# Patient Record
Sex: MALE | Race: WHITE | NOT HISPANIC OR LATINO | Employment: OTHER | URBAN - METROPOLITAN AREA
[De-identification: names, ages, dates, MRNs, and addresses within clinical notes are randomized per-mention and may not be internally consistent; named-entity substitution may affect disease eponyms.]

---

## 2021-05-25 ENCOUNTER — RECORDS - HEALTHEAST (OUTPATIENT)
Dept: ADMINISTRATIVE | Facility: CLINIC | Age: 76
End: 2021-05-25

## 2021-05-26 ENCOUNTER — RECORDS - HEALTHEAST (OUTPATIENT)
Dept: ADMINISTRATIVE | Facility: CLINIC | Age: 76
End: 2021-05-26

## 2021-05-27 ENCOUNTER — RECORDS - HEALTHEAST (OUTPATIENT)
Dept: ADMINISTRATIVE | Facility: CLINIC | Age: 76
End: 2021-05-27

## 2021-05-28 ENCOUNTER — RECORDS - HEALTHEAST (OUTPATIENT)
Dept: ADMINISTRATIVE | Facility: CLINIC | Age: 76
End: 2021-05-28

## 2021-05-29 ENCOUNTER — RECORDS - HEALTHEAST (OUTPATIENT)
Dept: ADMINISTRATIVE | Facility: CLINIC | Age: 76
End: 2021-05-29

## 2021-05-30 ENCOUNTER — RECORDS - HEALTHEAST (OUTPATIENT)
Dept: ADMINISTRATIVE | Facility: CLINIC | Age: 76
End: 2021-05-30

## 2021-05-31 ENCOUNTER — RECORDS - HEALTHEAST (OUTPATIENT)
Dept: ADMINISTRATIVE | Facility: CLINIC | Age: 76
End: 2021-05-31

## 2022-01-01 ENCOUNTER — APPOINTMENT (OUTPATIENT)
Dept: RADIOLOGY | Facility: CLINIC | Age: 77
End: 2022-01-01
Attending: PHYSICIAN ASSISTANT
Payer: COMMERCIAL

## 2022-01-01 ENCOUNTER — HOSPITAL ENCOUNTER (EMERGENCY)
Facility: CLINIC | Age: 77
End: 2022-10-14
Attending: EMERGENCY MEDICINE | Admitting: EMERGENCY MEDICINE
Payer: COMMERCIAL

## 2022-01-01 ENCOUNTER — APPOINTMENT (OUTPATIENT)
Dept: OCCUPATIONAL THERAPY | Facility: CLINIC | Age: 77
End: 2022-01-01
Attending: ORTHOPAEDIC SURGERY
Payer: COMMERCIAL

## 2022-01-01 ENCOUNTER — HOSPITAL ENCOUNTER (OUTPATIENT)
Facility: CLINIC | Age: 77
End: 2022-01-01
Attending: ORTHOPAEDIC SURGERY | Admitting: ORTHOPAEDIC SURGERY
Payer: MEDICARE

## 2022-01-01 ENCOUNTER — ANESTHESIA (OUTPATIENT)
Dept: SURGERY | Facility: CLINIC | Age: 77
End: 2022-01-01
Payer: COMMERCIAL

## 2022-01-01 ENCOUNTER — ANESTHESIA EVENT (OUTPATIENT)
Dept: SURGERY | Facility: CLINIC | Age: 77
End: 2022-01-01
Payer: COMMERCIAL

## 2022-01-01 ENCOUNTER — HOSPITAL ENCOUNTER (OUTPATIENT)
Facility: CLINIC | Age: 77
Discharge: HOME | End: 2022-10-14
Attending: ORTHOPAEDIC SURGERY | Admitting: ORTHOPAEDIC SURGERY
Payer: COMMERCIAL

## 2022-01-01 VITALS
DIASTOLIC BLOOD PRESSURE: 65 MMHG | OXYGEN SATURATION: 96 % | RESPIRATION RATE: 20 BRPM | WEIGHT: 305.4 LBS | TEMPERATURE: 98 F | BODY MASS INDEX: 43.72 KG/M2 | HEART RATE: 74 BPM | SYSTOLIC BLOOD PRESSURE: 149 MMHG | HEIGHT: 70 IN

## 2022-01-01 DIAGNOSIS — I46.9 CARDIAC ARREST (H): ICD-10-CM

## 2022-01-01 DIAGNOSIS — Z98.890 STATUS POST ARTHROSCOPY OF SHOULDER: Primary | ICD-10-CM

## 2022-01-01 DIAGNOSIS — Z96.612 S/P SHOULDER REPLACEMENT, LEFT: ICD-10-CM

## 2022-01-01 LAB
ANION GAP SERPL CALCULATED.3IONS-SCNC: 8 MMOL/L (ref 5–18)
BUN SERPL-MCNC: 18 MG/DL (ref 8–28)
CA-I BLD-MCNC: 4.7 MG/DL (ref 4.4–5.2)
CALCIUM SERPL-MCNC: 8.7 MG/DL (ref 8.5–10.5)
CHLORIDE BLD-SCNC: 94 MMOL/L (ref 98–107)
CO2 SERPL-SCNC: 29 MMOL/L (ref 22–31)
CPB POCT: NO
CREAT SERPL-MCNC: 0.97 MG/DL (ref 0.7–1.3)
GFR SERPL CREATININE-BSD FRML MDRD: 80 ML/MIN/1.73M2
GLUCOSE BLD-MCNC: 195 MG/DL (ref 70–125)
GLUCOSE BLD-MCNC: 527 MG/DL (ref 70–99)
GLUCOSE BLDC GLUCOMTR-MCNC: 126 MG/DL (ref 70–99)
GLUCOSE BLDC GLUCOMTR-MCNC: 160 MG/DL (ref 70–99)
GLUCOSE BLDC GLUCOMTR-MCNC: 185 MG/DL (ref 70–99)
GLUCOSE BLDC GLUCOMTR-MCNC: 228 MG/DL (ref 70–99)
GLUCOSE BLDC GLUCOMTR-MCNC: 285 MG/DL (ref 70–99)
GLUCOSE BLDC GLUCOMTR-MCNC: 321 MG/DL (ref 70–99)
HCO3 BLDV-SCNC: 23 MMOL/L (ref 21–28)
HCT VFR BLD CALC: 36 % (ref 40–53)
HGB BLD-MCNC: 10.7 G/DL (ref 13.3–17.7)
HGB BLD-MCNC: 12.2 G/DL (ref 13.3–17.7)
HOLD SPECIMEN: NORMAL
MAGNESIUM SERPL-MCNC: 1.6 MG/DL (ref 1.8–2.6)
PCO2 BLDV: 121 MM HG (ref 40–50)
PH BLDV: 6.88 [PH] (ref 7.32–7.43)
PO2 BLDV: 18 MM HG (ref 25–47)
POTASSIUM BLD-SCNC: 4.3 MMOL/L (ref 3.5–5)
POTASSIUM BLD-SCNC: 4.6 MMOL/L (ref 3.4–5.3)
SAO2 % BLDV: 10 % (ref 94–100)
SODIUM BLD-SCNC: 129 MMOL/L (ref 133–144)
SODIUM SERPL-SCNC: 131 MMOL/L (ref 136–145)
TROPONIN T BLD-MCNC: 0.01 UG/L

## 2022-01-01 PROCEDURE — 92950 HEART/LUNG RESUSCITATION CPR: CPT | Performed by: INTERNAL MEDICINE

## 2022-01-01 PROCEDURE — 99285 EMERGENCY DEPT VISIT HI MDM: CPT | Mod: 25 | Performed by: EMERGENCY MEDICINE

## 2022-01-01 PROCEDURE — 250N000011 HC RX IP 250 OP 636: Performed by: ANESTHESIOLOGY

## 2022-01-01 PROCEDURE — 272N000001 HC OR GENERAL SUPPLY STERILE: Performed by: ORTHOPAEDIC SURGERY

## 2022-01-01 PROCEDURE — 258N000003 HC RX IP 258 OP 636: Performed by: ANESTHESIOLOGY

## 2022-01-01 PROCEDURE — 250N000013 HC RX MED GY IP 250 OP 250 PS 637: Performed by: ANESTHESIOLOGY

## 2022-01-01 PROCEDURE — C1713 ANCHOR/SCREW BN/BN,TIS/BN: HCPCS | Performed by: ORTHOPAEDIC SURGERY

## 2022-01-01 PROCEDURE — 999N000141 HC STATISTIC PRE-PROCEDURE NURSING ASSESSMENT: Performed by: ORTHOPAEDIC SURGERY

## 2022-01-01 PROCEDURE — 80048 BASIC METABOLIC PNL TOTAL CA: CPT | Performed by: HOSPITALIST

## 2022-01-01 PROCEDURE — 82962 GLUCOSE BLOOD TEST: CPT

## 2022-01-01 PROCEDURE — 360N000077 HC SURGERY LEVEL 4, PER MIN: Performed by: ORTHOPAEDIC SURGERY

## 2022-01-01 PROCEDURE — 36415 COLL VENOUS BLD VENIPUNCTURE: CPT | Performed by: PHYSICIAN ASSISTANT

## 2022-01-01 PROCEDURE — 370N000017 HC ANESTHESIA TECHNICAL FEE, PER MIN: Performed by: ORTHOPAEDIC SURGERY

## 2022-01-01 PROCEDURE — 250N000011 HC RX IP 250 OP 636: Performed by: HOSPITALIST

## 2022-01-01 PROCEDURE — 97535 SELF CARE MNGMENT TRAINING: CPT | Mod: GO

## 2022-01-01 PROCEDURE — 97166 OT EVAL MOD COMPLEX 45 MIN: CPT | Mod: GO

## 2022-01-01 PROCEDURE — 258N000001 HC RX 258: Performed by: ORTHOPAEDIC SURGERY

## 2022-01-01 PROCEDURE — 99285 EMERGENCY DEPT VISIT HI MDM: CPT | Performed by: EMERGENCY MEDICINE

## 2022-01-01 PROCEDURE — C9290 INJ, BUPIVACAINE LIPOSOME: HCPCS | Performed by: ANESTHESIOLOGY

## 2022-01-01 PROCEDURE — 83735 ASSAY OF MAGNESIUM: CPT | Performed by: HOSPITALIST

## 2022-01-01 PROCEDURE — C1894 INTRO/SHEATH, NON-LASER: HCPCS | Performed by: INTERNAL MEDICINE

## 2022-01-01 PROCEDURE — 96372 THER/PROPH/DIAG INJ SC/IM: CPT | Performed by: HOSPITALIST

## 2022-01-01 PROCEDURE — 250N000011 HC RX IP 250 OP 636: Performed by: ORTHOPAEDIC SURGERY

## 2022-01-01 PROCEDURE — 84484 ASSAY OF TROPONIN QUANT: CPT

## 2022-01-01 PROCEDURE — 250N000009 HC RX 250: Performed by: ANESTHESIOLOGY

## 2022-01-01 PROCEDURE — 82947 ASSAY GLUCOSE BLOOD QUANT: CPT

## 2022-01-01 PROCEDURE — 99204 OFFICE O/P NEW MOD 45 MIN: CPT | Performed by: HOSPITALIST

## 2022-01-01 PROCEDURE — 250N000011 HC RX IP 250 OP 636: Performed by: PHYSICIAN ASSISTANT

## 2022-01-01 PROCEDURE — 99215 OFFICE O/P EST HI 40 MIN: CPT | Performed by: HOSPITALIST

## 2022-01-01 PROCEDURE — 272N000001 HC OR GENERAL SUPPLY STERILE: Performed by: INTERNAL MEDICINE

## 2022-01-01 PROCEDURE — 97110 THERAPEUTIC EXERCISES: CPT | Mod: GO

## 2022-01-01 PROCEDURE — C1769 GUIDE WIRE: HCPCS | Performed by: INTERNAL MEDICINE

## 2022-01-01 PROCEDURE — 258N000003 HC RX IP 258 OP 636: Performed by: NURSE ANESTHETIST, CERTIFIED REGISTERED

## 2022-01-01 PROCEDURE — 250N000011 HC RX IP 250 OP 636: Performed by: NURSE ANESTHETIST, CERTIFIED REGISTERED

## 2022-01-01 PROCEDURE — 250N000025 HC SEVOFLURANE, PER MIN: Performed by: ORTHOPAEDIC SURGERY

## 2022-01-01 PROCEDURE — 82330 ASSAY OF CALCIUM: CPT

## 2022-01-01 PROCEDURE — 31500 INSERT EMERGENCY AIRWAY: CPT | Performed by: EMERGENCY MEDICINE

## 2022-01-01 PROCEDURE — 92950 HEART/LUNG RESUSCITATION CPR: CPT | Performed by: EMERGENCY MEDICINE

## 2022-01-01 PROCEDURE — 258N000003 HC RX IP 258 OP 636: Performed by: PHYSICIAN ASSISTANT

## 2022-01-01 PROCEDURE — 250N000013 HC RX MED GY IP 250 OP 250 PS 637: Performed by: PHYSICIAN ASSISTANT

## 2022-01-01 PROCEDURE — 250N000013 HC RX MED GY IP 250 OP 250 PS 637: Performed by: HOSPITALIST

## 2022-01-01 PROCEDURE — 99207 CV CARDIAC CATHERIZATION: CPT | Performed by: INTERNAL MEDICINE

## 2022-01-01 PROCEDURE — C1776 JOINT DEVICE (IMPLANTABLE): HCPCS | Performed by: ORTHOPAEDIC SURGERY

## 2022-01-01 PROCEDURE — 250N000009 HC RX 250: Performed by: NURSE ANESTHETIST, CERTIFIED REGISTERED

## 2022-01-01 PROCEDURE — 250N000012 HC RX MED GY IP 250 OP 636 PS 637: Performed by: HOSPITALIST

## 2022-01-01 PROCEDURE — 85018 HEMOGLOBIN: CPT | Performed by: PHYSICIAN ASSISTANT

## 2022-01-01 PROCEDURE — 999N000065 XR SHOULDER LEFT PORT G/E 2 VIEWS: Mod: LT

## 2022-01-01 PROCEDURE — 710N000010 HC RECOVERY PHASE 1, LEVEL 2, PER MIN: Performed by: ORTHOPAEDIC SURGERY

## 2022-01-01 PROCEDURE — 250N000009 HC RX 250: Performed by: ORTHOPAEDIC SURGERY

## 2022-01-01 DEVICE — TRAY REVERSE LOW OFFSET +0 DWF510: Type: IMPLANTABLE DEVICE | Site: SHOULDER | Status: FUNCTIONAL

## 2022-01-01 DEVICE — IMPLANTABLE DEVICE: Type: IMPLANTABLE DEVICE | Site: SHOULDER | Status: FUNCTIONAL

## 2022-01-01 DEVICE — SCREW PERIPHERAL 5.0X30MM DWJ330: Type: IMPLANTABLE DEVICE | Site: SHOULDER | Status: FUNCTIONAL

## 2022-01-01 DEVICE — SCREW PERIPHERAL 5.0X34MM DWJ334: Type: IMPLANTABLE DEVICE | Site: SHOULDER | Status: FUNCTIONAL

## 2022-01-01 DEVICE — SCREW BSPLT 35MM 6.5MM AQLS PRFRM GLND RVRS CNTR NS DWJ135: Type: IMPLANTABLE DEVICE | Site: SHOULDER | Status: FUNCTIONAL

## 2022-01-01 DEVICE — BASEPLATE STANDARD AP REVERSED 29MM: Type: IMPLANTABLE DEVICE | Site: SHOULDER | Status: FUNCTIONAL

## 2022-01-01 DEVICE — SCREW PERIPHERAL 18MM DWJ318: Type: IMPLANTABLE DEVICE | Site: SHOULDER | Status: FUNCTIONAL

## 2022-01-01 DEVICE — IMPLANTABLE DEVICE
Type: IMPLANTABLE DEVICE | Site: SHOULDER | Status: FUNCTIONAL
Brand: TORNIER FLEX SHOULDER SYSTEM

## 2022-01-01 RX ORDER — ASPIRIN 81 MG/1
81 TABLET ORAL 2 TIMES DAILY
Status: DISCONTINUED | OUTPATIENT
Start: 2022-01-01 | End: 2022-01-01 | Stop reason: HOSPADM

## 2022-01-01 RX ORDER — PROCHLORPERAZINE MALEATE 5 MG
5 TABLET ORAL EVERY 6 HOURS PRN
Status: DISCONTINUED | OUTPATIENT
Start: 2022-01-01 | End: 2022-01-01 | Stop reason: HOSPADM

## 2022-01-01 RX ORDER — OXYCODONE HYDROCHLORIDE 5 MG/1
5 TABLET ORAL EVERY 4 HOURS PRN
Status: DISCONTINUED | OUTPATIENT
Start: 2022-01-01 | End: 2022-01-01 | Stop reason: HOSPADM

## 2022-01-01 RX ORDER — VANCOMYCIN HYDROCHLORIDE 1 G/20ML
INJECTION, POWDER, LYOPHILIZED, FOR SOLUTION INTRAVENOUS
Status: DISCONTINUED
Start: 2022-01-01 | End: 2022-01-01 | Stop reason: HOSPADM

## 2022-01-01 RX ORDER — BUPIVACAINE HYDROCHLORIDE 5 MG/ML
INJECTION, SOLUTION EPIDURAL; INTRACAUDAL
Status: DISCONTINUED | OUTPATIENT
Start: 2022-01-01 | End: 2022-01-01

## 2022-01-01 RX ORDER — OXYCODONE HYDROCHLORIDE 5 MG/1
10 TABLET ORAL EVERY 4 HOURS PRN
Status: DISCONTINUED | OUTPATIENT
Start: 2022-01-01 | End: 2022-01-01 | Stop reason: HOSPADM

## 2022-01-01 RX ORDER — POLYETHYLENE GLYCOL 3350 17 G/17G
17 POWDER, FOR SOLUTION ORAL DAILY
Status: DISCONTINUED | OUTPATIENT
Start: 2022-01-01 | End: 2022-01-01 | Stop reason: HOSPADM

## 2022-01-01 RX ORDER — ACETAMINOPHEN 325 MG/1
975 TABLET ORAL EVERY 8 HOURS
Status: DISCONTINUED | OUTPATIENT
Start: 2022-01-01 | End: 2022-01-01 | Stop reason: HOSPADM

## 2022-01-01 RX ORDER — PROPOFOL 10 MG/ML
INJECTION, EMULSION INTRAVENOUS PRN
Status: DISCONTINUED | OUTPATIENT
Start: 2022-01-01 | End: 2022-01-01

## 2022-01-01 RX ORDER — FENTANYL CITRATE 50 UG/ML
50 INJECTION, SOLUTION INTRAMUSCULAR; INTRAVENOUS
Status: DISCONTINUED | OUTPATIENT
Start: 2022-01-01 | End: 2022-01-01 | Stop reason: HOSPADM

## 2022-01-01 RX ORDER — NALOXONE HYDROCHLORIDE 0.4 MG/ML
0.2 INJECTION, SOLUTION INTRAMUSCULAR; INTRAVENOUS; SUBCUTANEOUS
Status: DISCONTINUED | OUTPATIENT
Start: 2022-01-01 | End: 2022-01-01 | Stop reason: HOSPADM

## 2022-01-01 RX ORDER — HYDROMORPHONE HCL IN WATER/PF 6 MG/30 ML
0.4 PATIENT CONTROLLED ANALGESIA SYRINGE INTRAVENOUS
Status: DISCONTINUED | OUTPATIENT
Start: 2022-01-01 | End: 2022-01-01 | Stop reason: HOSPADM

## 2022-01-01 RX ORDER — ACETAMINOPHEN 325 MG/1
650 TABLET ORAL EVERY 4 HOURS PRN
Status: DISCONTINUED | OUTPATIENT
Start: 2022-10-16 | End: 2022-01-01 | Stop reason: HOSPADM

## 2022-01-01 RX ORDER — ESCITALOPRAM OXALATE 20 MG/1
20 TABLET ORAL DAILY
COMMUNITY

## 2022-01-01 RX ORDER — BISACODYL 10 MG
10 SUPPOSITORY, RECTAL RECTAL DAILY PRN
Status: DISCONTINUED | OUTPATIENT
Start: 2022-01-01 | End: 2022-01-01 | Stop reason: HOSPADM

## 2022-01-01 RX ORDER — SIMVASTATIN 20 MG
20 TABLET ORAL AT BEDTIME
Status: DISCONTINUED | OUTPATIENT
Start: 2022-01-01 | End: 2022-01-01 | Stop reason: HOSPADM

## 2022-01-01 RX ORDER — DEXAMETHASONE SODIUM PHOSPHATE 4 MG/ML
INJECTION, SOLUTION INTRA-ARTICULAR; INTRALESIONAL; INTRAMUSCULAR; INTRAVENOUS; SOFT TISSUE PRN
Status: DISCONTINUED | OUTPATIENT
Start: 2022-01-01 | End: 2022-01-01

## 2022-01-01 RX ORDER — FLUTICASONE PROPIONATE 50 MCG
2 SPRAY, SUSPENSION (ML) NASAL DAILY
Status: DISCONTINUED | OUTPATIENT
Start: 2022-01-01 | End: 2022-01-01 | Stop reason: HOSPADM

## 2022-01-01 RX ORDER — ONDANSETRON 4 MG/1
4 TABLET, ORALLY DISINTEGRATING ORAL EVERY 30 MIN PRN
Status: DISCONTINUED | OUTPATIENT
Start: 2022-01-01 | End: 2022-01-01 | Stop reason: HOSPADM

## 2022-01-01 RX ORDER — OXYCODONE HYDROCHLORIDE 5 MG/1
5 TABLET ORAL EVERY 4 HOURS PRN
Qty: 25 TABLET | Refills: 0 | Status: SHIPPED | OUTPATIENT
Start: 2022-01-01

## 2022-01-01 RX ORDER — GABAPENTIN 300 MG/1
600 CAPSULE ORAL 3 TIMES DAILY
COMMUNITY

## 2022-01-01 RX ORDER — DEXTROSE MONOHYDRATE 25 G/50ML
25-50 INJECTION, SOLUTION INTRAVENOUS
Status: DISCONTINUED | OUTPATIENT
Start: 2022-01-01 | End: 2022-01-01 | Stop reason: HOSPADM

## 2022-01-01 RX ORDER — SODIUM CHLORIDE, SODIUM LACTATE, POTASSIUM CHLORIDE, CALCIUM CHLORIDE 600; 310; 30; 20 MG/100ML; MG/100ML; MG/100ML; MG/100ML
INJECTION, SOLUTION INTRAVENOUS CONTINUOUS
Status: DISCONTINUED | OUTPATIENT
Start: 2022-01-01 | End: 2022-01-01 | Stop reason: HOSPADM

## 2022-01-01 RX ORDER — FENTANYL CITRATE 50 UG/ML
25 INJECTION, SOLUTION INTRAMUSCULAR; INTRAVENOUS EVERY 5 MIN PRN
Status: DISCONTINUED | OUTPATIENT
Start: 2022-01-01 | End: 2022-01-01 | Stop reason: HOSPADM

## 2022-01-01 RX ORDER — TRANEXAMIC ACID 650 MG/1
1950 TABLET ORAL ONCE
Status: COMPLETED | OUTPATIENT
Start: 2022-01-01 | End: 2022-01-01

## 2022-01-01 RX ORDER — CEFAZOLIN SODIUM/WATER 2 G/20 ML
2 SYRINGE (ML) INTRAVENOUS SEE ADMIN INSTRUCTIONS
Status: DISCONTINUED | OUTPATIENT
Start: 2022-01-01 | End: 2022-01-01 | Stop reason: HOSPADM

## 2022-01-01 RX ORDER — POLYETHYLENE GLYCOL 3350 17 G/17G
1 POWDER, FOR SOLUTION ORAL DAILY PRN
COMMUNITY

## 2022-01-01 RX ORDER — NICOTINE POLACRILEX 4 MG
15-30 LOZENGE BUCCAL
Status: DISCONTINUED | OUTPATIENT
Start: 2022-01-01 | End: 2022-01-01 | Stop reason: HOSPADM

## 2022-01-01 RX ORDER — ONDANSETRON 2 MG/ML
4 INJECTION INTRAMUSCULAR; INTRAVENOUS EVERY 30 MIN PRN
Status: DISCONTINUED | OUTPATIENT
Start: 2022-01-01 | End: 2022-01-01 | Stop reason: HOSPADM

## 2022-01-01 RX ORDER — HYDROCODONE BITARTRATE AND ACETAMINOPHEN 5; 325 MG/1; MG/1
1 TABLET ORAL EVERY 4 HOURS PRN
Status: ON HOLD | COMMUNITY
End: 2022-01-01

## 2022-01-01 RX ORDER — ACETAMINOPHEN 325 MG/1
975 TABLET ORAL ONCE
Status: COMPLETED | OUTPATIENT
Start: 2022-01-01 | End: 2022-01-01

## 2022-01-01 RX ORDER — GABAPENTIN 300 MG/1
600 CAPSULE ORAL 3 TIMES DAILY
Status: DISCONTINUED | OUTPATIENT
Start: 2022-01-01 | End: 2022-01-01 | Stop reason: HOSPADM

## 2022-01-01 RX ORDER — CEFAZOLIN SODIUM IN 0.9 % NACL 3 G/100 ML
INTRAVENOUS SOLUTION, PIGGYBACK (ML) INTRAVENOUS PRN
Status: DISCONTINUED | OUTPATIENT
Start: 2022-01-01 | End: 2022-01-01

## 2022-01-01 RX ORDER — ONDANSETRON 2 MG/ML
4 INJECTION INTRAMUSCULAR; INTRAVENOUS EVERY 6 HOURS PRN
Status: DISCONTINUED | OUTPATIENT
Start: 2022-01-01 | End: 2022-01-01 | Stop reason: HOSPADM

## 2022-01-01 RX ORDER — GLIPIZIDE 10 MG/1
10 TABLET, FILM COATED, EXTENDED RELEASE ORAL 2 TIMES DAILY
COMMUNITY

## 2022-01-01 RX ORDER — MAGNESIUM HYDROXIDE 1200 MG/15ML
LIQUID ORAL PRN
Status: DISCONTINUED | OUTPATIENT
Start: 2022-01-01 | End: 2022-01-01 | Stop reason: HOSPADM

## 2022-01-01 RX ORDER — POLYETHYLENE GLYCOL 3350 17 G/17G
17 POWDER, FOR SOLUTION ORAL DAILY PRN
Status: DISCONTINUED | OUTPATIENT
Start: 2022-01-01 | End: 2022-01-01 | Stop reason: HOSPADM

## 2022-01-01 RX ORDER — ESCITALOPRAM OXALATE 20 MG/1
20 TABLET ORAL DAILY
Status: DISCONTINUED | OUTPATIENT
Start: 2022-01-01 | End: 2022-01-01 | Stop reason: HOSPADM

## 2022-01-01 RX ORDER — LABETALOL HYDROCHLORIDE 5 MG/ML
10 INJECTION, SOLUTION INTRAVENOUS ONCE
Status: COMPLETED | OUTPATIENT
Start: 2022-01-01 | End: 2022-01-01

## 2022-01-01 RX ORDER — ACETAMINOPHEN 325 MG/1
325 TABLET ORAL EVERY 6 HOURS PRN
Qty: 30 TABLET | Refills: 0 | Status: SHIPPED | OUTPATIENT
Start: 2022-01-01

## 2022-01-01 RX ORDER — LISINOPRIL 20 MG/1
20 TABLET ORAL DAILY
Status: DISCONTINUED | OUTPATIENT
Start: 2022-01-01 | End: 2022-01-01 | Stop reason: HOSPADM

## 2022-01-01 RX ORDER — HYDROMORPHONE HCL IN WATER/PF 6 MG/30 ML
0.2 PATIENT CONTROLLED ANALGESIA SYRINGE INTRAVENOUS
Status: DISCONTINUED | OUTPATIENT
Start: 2022-01-01 | End: 2022-01-01 | Stop reason: HOSPADM

## 2022-01-01 RX ORDER — CEFAZOLIN SODIUM/WATER 3 G/30 ML
SYRINGE (ML) INTRAVENOUS
Status: DISCONTINUED
Start: 2022-01-01 | End: 2022-01-01 | Stop reason: HOSPADM

## 2022-01-01 RX ORDER — LIDOCAINE 40 MG/G
CREAM TOPICAL
Status: DISCONTINUED | OUTPATIENT
Start: 2022-01-01 | End: 2022-01-01 | Stop reason: HOSPADM

## 2022-01-01 RX ORDER — VANCOMYCIN HYDROCHLORIDE 1 G/20ML
INJECTION, POWDER, LYOPHILIZED, FOR SOLUTION INTRAVENOUS PRN
Status: DISCONTINUED | OUTPATIENT
Start: 2022-01-01 | End: 2022-01-01 | Stop reason: HOSPADM

## 2022-01-01 RX ORDER — NALOXONE HYDROCHLORIDE 0.4 MG/ML
0.4 INJECTION, SOLUTION INTRAMUSCULAR; INTRAVENOUS; SUBCUTANEOUS
Status: DISCONTINUED | OUTPATIENT
Start: 2022-01-01 | End: 2022-01-01 | Stop reason: HOSPADM

## 2022-01-01 RX ORDER — VANCOMYCIN HYDROCHLORIDE 1 G/20ML
1 INJECTION, POWDER, LYOPHILIZED, FOR SOLUTION INTRAVENOUS ONCE
Status: DISCONTINUED | OUTPATIENT
Start: 2022-01-01 | End: 2022-01-01 | Stop reason: HOSPADM

## 2022-01-01 RX ORDER — CEFAZOLIN SODIUM 2 G/100ML
2 INJECTION, SOLUTION INTRAVENOUS EVERY 8 HOURS
Status: COMPLETED | OUTPATIENT
Start: 2022-01-01 | End: 2022-01-01

## 2022-01-01 RX ORDER — MAGNESIUM SULFATE HEPTAHYDRATE 40 MG/ML
2 INJECTION, SOLUTION INTRAVENOUS ONCE
Status: COMPLETED | OUTPATIENT
Start: 2022-01-01 | End: 2022-01-01

## 2022-01-01 RX ORDER — AMOXICILLIN 250 MG
1 CAPSULE ORAL DAILY PRN
Qty: 20 TABLET | Refills: 0 | Status: SHIPPED | OUTPATIENT
Start: 2022-01-01

## 2022-01-01 RX ORDER — ONDANSETRON 4 MG/1
4 TABLET, ORALLY DISINTEGRATING ORAL EVERY 6 HOURS PRN
Status: DISCONTINUED | OUTPATIENT
Start: 2022-01-01 | End: 2022-01-01 | Stop reason: HOSPADM

## 2022-01-01 RX ORDER — AMOXICILLIN 250 MG
1 CAPSULE ORAL 2 TIMES DAILY
Status: DISCONTINUED | OUTPATIENT
Start: 2022-01-01 | End: 2022-01-01 | Stop reason: HOSPADM

## 2022-01-01 RX ORDER — ONDANSETRON 2 MG/ML
INJECTION INTRAMUSCULAR; INTRAVENOUS PRN
Status: DISCONTINUED | OUTPATIENT
Start: 2022-01-01 | End: 2022-01-01

## 2022-01-01 RX ORDER — INSULIN LISPRO 100 [IU]/ML
18 INJECTION, SUSPENSION SUBCUTANEOUS
COMMUNITY

## 2022-01-01 RX ORDER — FENTANYL CITRATE 50 UG/ML
100 INJECTION, SOLUTION INTRAMUSCULAR; INTRAVENOUS
Status: DISCONTINUED | OUTPATIENT
Start: 2022-01-01 | End: 2022-01-01 | Stop reason: HOSPADM

## 2022-01-01 RX ORDER — PANTOPRAZOLE SODIUM 20 MG/1
40 TABLET, DELAYED RELEASE ORAL DAILY
Status: DISCONTINUED | OUTPATIENT
Start: 2022-01-01 | End: 2022-01-01 | Stop reason: HOSPADM

## 2022-01-01 RX ORDER — HYDROMORPHONE HCL IN WATER/PF 6 MG/30 ML
0.2 PATIENT CONTROLLED ANALGESIA SYRINGE INTRAVENOUS EVERY 5 MIN PRN
Status: DISCONTINUED | OUTPATIENT
Start: 2022-01-01 | End: 2022-01-01 | Stop reason: HOSPADM

## 2022-01-01 RX ORDER — CEFAZOLIN SODIUM/WATER 2 G/20 ML
2 SYRINGE (ML) INTRAVENOUS
Status: DISCONTINUED | OUTPATIENT
Start: 2022-01-01 | End: 2022-01-01 | Stop reason: HOSPADM

## 2022-01-01 RX ADMIN — DEXAMETHASONE SODIUM PHOSPHATE 4 MG: 4 INJECTION, SOLUTION INTRA-ARTICULAR; INTRALESIONAL; INTRAMUSCULAR; INTRAVENOUS; SOFT TISSUE at 07:50

## 2022-01-01 RX ADMIN — ONDANSETRON 4 MG: 2 INJECTION INTRAMUSCULAR; INTRAVENOUS at 07:50

## 2022-01-01 RX ADMIN — POLYETHYLENE GLYCOL 3350 17 G: 17 POWDER, FOR SOLUTION ORAL at 08:43

## 2022-01-01 RX ADMIN — INSULIN ASPART 14 UNITS: 100 INJECTION, SUSPENSION SUBCUTANEOUS at 08:42

## 2022-01-01 RX ADMIN — MIDAZOLAM HYDROCHLORIDE 1 MG: 1 INJECTION, SOLUTION INTRAMUSCULAR; INTRAVENOUS at 07:02

## 2022-01-01 RX ADMIN — LISINOPRIL 20 MG: 20 TABLET ORAL at 16:03

## 2022-01-01 RX ADMIN — PHENYLEPHRINE HYDROCHLORIDE 0.3 MCG/KG/MIN: 10 INJECTION INTRAVENOUS at 07:58

## 2022-01-01 RX ADMIN — SODIUM CHLORIDE, POTASSIUM CHLORIDE, SODIUM LACTATE AND CALCIUM CHLORIDE: 600; 310; 30; 20 INJECTION, SOLUTION INTRAVENOUS at 06:40

## 2022-01-01 RX ADMIN — GABAPENTIN 600 MG: 300 CAPSULE ORAL at 20:44

## 2022-01-01 RX ADMIN — SODIUM CHLORIDE, POTASSIUM CHLORIDE, SODIUM LACTATE AND CALCIUM CHLORIDE: 600; 310; 30; 20 INJECTION, SOLUTION INTRAVENOUS at 16:03

## 2022-01-01 RX ADMIN — GABAPENTIN 600 MG: 300 CAPSULE ORAL at 08:34

## 2022-01-01 RX ADMIN — PHENYLEPHRINE HYDROCHLORIDE 100 MCG: 10 INJECTION INTRAVENOUS at 07:58

## 2022-01-01 RX ADMIN — PANTOPRAZOLE SODIUM 40 MG: 20 TABLET, DELAYED RELEASE ORAL at 08:34

## 2022-01-01 RX ADMIN — ROCURONIUM BROMIDE 50 MG: 10 INJECTION, SOLUTION INTRAVENOUS at 07:41

## 2022-01-01 RX ADMIN — ACETAMINOPHEN 975 MG: 325 TABLET, FILM COATED ORAL at 06:22

## 2022-01-01 RX ADMIN — CEFAZOLIN SODIUM 2 G: 2 INJECTION, SOLUTION INTRAVENOUS at 00:39

## 2022-01-01 RX ADMIN — FLUTICASONE PROPIONATE 2 SPRAY: 50 SPRAY, METERED NASAL at 08:35

## 2022-01-01 RX ADMIN — INSULIN ASPART 2 UNITS: 100 INJECTION, SOLUTION INTRAVENOUS; SUBCUTANEOUS at 08:35

## 2022-01-01 RX ADMIN — SODIUM CHLORIDE, POTASSIUM CHLORIDE, SODIUM LACTATE AND CALCIUM CHLORIDE: 600; 310; 30; 20 INJECTION, SOLUTION INTRAVENOUS at 08:58

## 2022-01-01 RX ADMIN — ASPIRIN 81 MG: 81 TABLET, COATED ORAL at 08:34

## 2022-01-01 RX ADMIN — ACETAMINOPHEN 975 MG: 325 TABLET, FILM COATED ORAL at 21:25

## 2022-01-01 RX ADMIN — INSULIN ASPART 14 UNITS: 100 INJECTION, SUSPENSION SUBCUTANEOUS at 16:49

## 2022-01-01 RX ADMIN — INSULIN ASPART 3 UNITS: 100 INJECTION, SOLUTION INTRAVENOUS; SUBCUTANEOUS at 16:50

## 2022-01-01 RX ADMIN — SIMVASTATIN 20 MG: 20 TABLET, FILM COATED ORAL at 20:44

## 2022-01-01 RX ADMIN — SENNOSIDES AND DOCUSATE SODIUM 1 TABLET: 50; 8.6 TABLET ORAL at 20:44

## 2022-01-01 RX ADMIN — MAGNESIUM SULFATE HEPTAHYDRATE 2 G: 40 INJECTION, SOLUTION INTRAVENOUS at 09:33

## 2022-01-01 RX ADMIN — ACETAMINOPHEN 975 MG: 325 TABLET, FILM COATED ORAL at 13:34

## 2022-01-01 RX ADMIN — OXYCODONE HYDROCHLORIDE 5 MG: 5 TABLET ORAL at 09:53

## 2022-01-01 RX ADMIN — LABETALOL HYDROCHLORIDE 10 MG: 5 INJECTION, SOLUTION INTRAVENOUS at 10:34

## 2022-01-01 RX ADMIN — SUGAMMADEX 200 MG: 100 INJECTION, SOLUTION INTRAVENOUS at 09:19

## 2022-01-01 RX ADMIN — OXYCODONE HYDROCHLORIDE 5 MG: 5 TABLET ORAL at 18:56

## 2022-01-01 RX ADMIN — OXYCODONE HYDROCHLORIDE 10 MG: 5 TABLET ORAL at 00:47

## 2022-01-01 RX ADMIN — SENNOSIDES AND DOCUSATE SODIUM 1 TABLET: 50; 8.6 TABLET ORAL at 08:34

## 2022-01-01 RX ADMIN — Medication 3 G: at 07:33

## 2022-01-01 RX ADMIN — TRANEXAMIC ACID 1950 MG: 650 TABLET ORAL at 06:09

## 2022-01-01 RX ADMIN — ASPIRIN 81 MG: 81 TABLET, COATED ORAL at 20:44

## 2022-01-01 RX ADMIN — ESCITALOPRAM OXALATE 20 MG: 20 TABLET ORAL at 16:03

## 2022-01-01 RX ADMIN — ACETAMINOPHEN 975 MG: 325 TABLET, FILM COATED ORAL at 06:08

## 2022-01-01 RX ADMIN — OXYCODONE HYDROCHLORIDE 5 MG: 5 TABLET ORAL at 19:40

## 2022-01-01 RX ADMIN — BUPIVACAINE 10 ML: 13.3 INJECTION, SUSPENSION, LIPOSOMAL INFILTRATION at 07:05

## 2022-01-01 RX ADMIN — ESCITALOPRAM OXALATE 20 MG: 20 TABLET ORAL at 08:34

## 2022-01-01 RX ADMIN — GABAPENTIN 600 MG: 300 CAPSULE ORAL at 16:03

## 2022-01-01 RX ADMIN — BUPIVACAINE HYDROCHLORIDE 3 ML: 5 INJECTION, SOLUTION EPIDURAL; INTRACAUDAL at 07:07

## 2022-01-01 RX ADMIN — BUPIVACAINE HYDROCHLORIDE 12 ML: 5 INJECTION, SOLUTION EPIDURAL; INTRACAUDAL; PERINEURAL at 07:05

## 2022-01-01 RX ADMIN — CEFAZOLIN SODIUM 2 G: 2 INJECTION, SOLUTION INTRAVENOUS at 16:46

## 2022-01-01 RX ADMIN — OXYCODONE HYDROCHLORIDE 5 MG: 5 TABLET ORAL at 12:34

## 2022-01-01 RX ADMIN — PROPOFOL 150 MG: 10 INJECTION, EMULSION INTRAVENOUS at 07:41

## 2022-01-01 ASSESSMENT — ACTIVITIES OF DAILY LIVING (ADL)
ADLS_ACUITY_SCORE: 38
ADLS_ACUITY_SCORE: 35
ADLS_ACUITY_SCORE: 36
ADLS_ACUITY_SCORE: 35
ADLS_ACUITY_SCORE: 36
ADLS_ACUITY_SCORE: 35
ADLS_ACUITY_SCORE: 36
ADLS_ACUITY_SCORE: 36
ADLS_ACUITY_SCORE: 38
ADLS_ACUITY_SCORE: 38
ADLS_ACUITY_SCORE: 33
ADLS_ACUITY_SCORE: 35
ADLS_ACUITY_SCORE: 35
ADLS_ACUITY_SCORE: 38
ADLS_ACUITY_SCORE: 36
ADLS_ACUITY_SCORE: 38

## 2022-01-01 ASSESSMENT — COLUMBIA-SUICIDE SEVERITY RATING SCALE - C-SSRS
5. HAVE YOU STARTED TO WORK OUT OR WORKED OUT THE DETAILS OF HOW TO KILL YOURSELF? DO YOU INTEND TO CARRY OUT THIS PLAN?: NO
2. HAVE YOU ACTUALLY HAD ANY THOUGHTS OF KILLING YOURSELF IN THE PAST MONTH?: NO
1. IN THE PAST MONTH, HAVE YOU WISHED YOU WERE DEAD OR WISHED YOU COULD GO TO SLEEP AND NOT WAKE UP?: NO
4. HAVE YOU HAD THESE THOUGHTS AND HAD SOME INTENTION OF ACTING ON THEM?: NO
3. HAVE YOU BEEN THINKING ABOUT HOW YOU MIGHT KILL YOURSELF?: NO
6. HAVE YOU EVER DONE ANYTHING, STARTED TO DO ANYTHING, OR PREPARED TO DO ANYTHING TO END YOUR LIFE?: NO

## 2022-10-13 PROBLEM — F33.40 RECURRENT MAJOR DEPRESSIVE DISORDER, IN REMISSION (H): Status: ACTIVE | Noted: 2022-01-01

## 2022-10-13 PROBLEM — E11.9 TYPE 2 DIABETES MELLITUS WITHOUT COMPLICATION, WITHOUT LONG-TERM CURRENT USE OF INSULIN (H): Status: ACTIVE | Noted: 2022-01-01

## 2022-10-13 PROBLEM — I10 PRIMARY HYPERTENSION: Status: ACTIVE | Noted: 2022-01-01

## 2022-10-13 PROBLEM — E78.2 MIXED HYPERLIPIDEMIA: Status: ACTIVE | Noted: 2022-01-01

## 2022-10-13 PROBLEM — Z98.890 STATUS POST ARTHROSCOPY OF SHOULDER: Status: ACTIVE | Noted: 2022-01-01

## 2022-10-13 NOTE — PROGRESS NOTES
Care Management Follow Up    Length of Stay (days): 0    Expected Discharge Date: 10/14/2022     Concerns to be Addressed:     Patient plan of care discussed at interdisciplinary rounds: Yes    Anticipated Discharge Disposition: Home     Anticipated Discharge Services:  Home with family    Education Provided on the Discharge Plan:  AVS per bedside RN.    Patient/Family in Agreement with the Plan: yes      Additional Information:  Chart reviewed. CM met with patient. Patient states he lives with his wife. Denied needs from CM. States family will provide transportation home at discharge. CM will follow for possible discharge needs.       Jo Goncalves RN

## 2022-10-13 NOTE — ANESTHESIA PROCEDURE NOTES
Airway       Patient location during procedure: OR       Procedure Start/Stop Times: 10/13/2022 7:45 AM  Staff -        CRNA: Mikey Hill APRN CRNA       Performed By: CRNA  Consent for Airway        Urgency: elective  Indications and Patient Condition       Indications for airway management: benjamín-procedural         Mask difficulty assessment: 2 - vent by mask + OA or adjuvant +/- NMBA    Final Airway Details       Final airway type: endotracheal airway       Successful airway: ETT - single  Endotracheal Airway Details        ETT size (mm): 8.0       Cuffed: yes       Successful intubation technique: video laryngoscopy (Elective Glide)       VL Blade Size: Glidescope 4       Grade View of Cords: 1       Adjucts: stylet       Position: Right       Measured from: lips       Secured at (cm): 24    Post intubation assessment        Placement verified by: capnometry, equal breath sounds and chest rise        Number of attempts at approach: 1       Ease of procedure: easy       Dentition: Intact and Unchanged    Medication(s) Administered   Medication Administration Time: 10/13/2022 7:45 AM

## 2022-10-13 NOTE — ANESTHESIA PREPROCEDURE EVALUATION
Anesthesia Pre-Procedure Evaluation    Patient: González Sesay   MRN: [id number] : 1945        Procedure : Procedure(s):  REVERSE TOTAL SHOULDER ARTHROPLASTY          Past Medical History:   Diagnosis Date     Basal cell cancer     ear     Bleeding ulcer      Bone lesion      Carotid stenosis     right, mod stenosis     Depression      Diabetes mellitus (H)      Gastro-oesophageal reflux disease      Hypertension      Multiple sclerosis (H)     in remission     Obese       Past Surgical History:   Procedure Laterality Date     ARTHROSCOPY KNEE RT/LT      left     BIOPSY BONE LOWER EXTREMITY  2012    Procedure: BIOPSY BONE LOWER EXTREMITY;  Biopsy Curettage and Bone Grafting of Lesion Right Femoral Neck, Internal Fixation With Cannulated Screw;  Surgeon: Mat Cruz MD;  Location: [facility]     CAROTID ENDARTERECTOMY      right     CARPAL TUNNEL RELEASE RT/LT      both wrists     MIDDLE EAR SURGERY       OPEN REDUCTION INTERNAL FIXATION FEMUR PROXIMAL  2012    Procedure: OPEN REDUCTION INTERNAL FIXATION FEMUR PROXIMAL;;  Surgeon: Mat Cruz MD;  Location: [facility] OR     ROTATOR CUFF REPAIR RT/LT      twice right, once on left     THYROID SURGERY      for goiter      Allergies   Allergen Reactions     Hibiclens Rash      Social History     Tobacco Use     Smoking status: Former     Smokeless tobacco: Never   Substance Use Topics     Alcohol use: Not Currently     Comment: wine socially      Wt Readings from Last 1 Encounters:   10/13/22 138.5 kg (305 lb 6.4 oz)        Anesthesia Evaluation   Pt has had prior anesthetic.     No history of anesthetic complications       ROS/MED HX  ENT/Pulmonary: Comment: Mild hypoventilation apparent after taking vicodin this morning      Neurologic: Comment: Hx of carotid stenosis, s/p CEA    (+) Multiple Sclerosis (in remission),     Cardiovascular:     (+) hypertension-----    METS/Exercise Tolerance:     Hematologic:       Musculoskeletal:       GI/Hepatic:      (+) GERD,     Renal/Genitourinary:       Endo:     (+) type II DM, Obesity,     Psychiatric/Substance Use:       Infectious Disease:       Malignancy:       Other:            Physical Exam    Airway        Mallampati: II   TM distance: > 3 FB   Neck ROM: full   Mouth opening: > 3 cm    Respiratory Devices and Support         Dental     Comment: Fair dentition        Cardiovascular          Rhythm and rate: regular and normal     Pulmonary           breath sounds clear to auscultation           OUTSIDE LABS:  CBC:   Lab Results   Component Value Date    HGB 10.5 (L) 08/24/2012    HGB 10.4 (L) 08/23/2012     BMP:   Lab Results   Component Value Date     08/24/2012     (L) 08/23/2012    POTASSIUM 4.7 08/24/2012    POTASSIUM 4.1 08/23/2012    CHLORIDE 99 08/24/2012    CHLORIDE 100 08/23/2012    CO2 29 08/24/2012    CO2 24 08/23/2012    BUN 18 08/24/2012    BUN 18 08/23/2012    CR 0.90 08/24/2012    CR 1.00 08/23/2012     (H) 10/13/2022     (H) 08/24/2012     COAGS:   Lab Results   Component Value Date    INR 1.6 09/10/2012     POC:   Lab Results   Component Value Date     (H) 08/24/2012     HEPATIC: No results found for: ALBUMIN, PROTTOTAL, ALT, AST, GGT, ALKPHOS, BILITOTAL, BILIDIRECT, CLIFFORD  OTHER:   Lab Results   Component Value Date    MISSY 8.6 08/24/2012       Anesthesia Plan    ASA Status:  3   NPO Status:  NPO Appropriate    Anesthesia Type: General.     - Airway: ETT              Consents    Anesthesia Plan(s) and associated risks, benefits, and realistic alternatives discussed. Questions answered and patient/representative(s) expressed understanding.     - Discussed: Risks, Benefits and Alternatives for the PROCEDURE were discussed     - Discussed with:  Patient, Spouse         Postoperative Care    Pain management: Peripheral nerve block (Single Shot), IV analgesics, Oral pain medications.   PONV prophylaxis: Ondansetron (or other 5HT-3), Dexamethasone or Solumedrol      Comments:                Drew Velasco MD

## 2022-10-13 NOTE — CONSULTS
[facility]  Consult Note - Hospitalist Service  Date of Admission:  10/13/2022  Consult Requested by: Orthopedics  Reason for Consult: Post-op Medical Care, DM2, HTN    Assessment & Plan   González Sesay is a 76 year old old male with a history of DM2, HTN, HLD, MDD underwent left shoulder arthroplasty with general anesthesia. [facility] service was asked to evaluate patient for postoperative medical management as follows below. Please resume the home medications as reconciled and further noted below with ordered hold parameters.  Vital signs have been stable post-operatively including hemodynamically stable blood pressure and heart rate. Thank you for this consult; we will continue to follow this patient until discharge.    HTN  -Order home medications with the written tiered hold parameters given risk for post-operative hypotension. Given ACEi/ARB/diuretic medication, ordered creatine, potassium, and magnesium to evaluate renal function and electrolytes, respectively.      DM2  -Hold home oral anti-glycemics post-op given risk for acute hypoglycemia with sulfonylureas and nephrotoxicity if contrast is utilized in any emergent imaging with biguanides; utilize insulin corrections three times a day and at bedtime. Restart home oral anti-glycemics step-wise as tolerated once patient is eating as per their normal, usual diet.  -Carb-controlled/diabetic diet.      HLD  -Order home statin.    MDD/Anxiety  -Order home medications.     S/p Left Shoulder Arthroplasty  Shoulder OA  Acute Post-Op Pain  -Agree with current pain control regimen; consider acute pain team consultation if increasingly difficult to control or proves refractory.   -PT evaluation.   -OT evaluation.  -IS frequently, initially every hour while awake as tolerated. Directly encouraged and discussed.      Post-Op DVT Prophylaxis  -As per primary surgery team.      Procedure(s):  LEFT REVERSE TOTAL SHOULDER  "ARTHROPLASTY  Post-operative Day: Day of Surgery  Code status:Full Code     Type of Anesthesia   General    Estimated Blood Loss:  50 mL    Hospital Problem List   No problem-specific Assessment & Plan notes found for this encounter.    Active Problems:    * No active hospital problems. *      -Reviewed the patient's preoperative H and P and updated missing elements.  -Home medication reconciliation has been reviewed.  Medications have been ordered as noted from the home list and changes are documented above        The patient's care was discussed with the Patient.    Cristobal Nuñez MD  [facility]  Securely message with the Vocera Web Console (learn more here)  Text page via [employer]       Hospitalist Service    Clinically Significant Risk Factors Present on Admission                 # Hypertension: home medication list includes antihypertensive(s)    # Severe Obesity: Estimated body mass index is 43.82 kg/m  as calculated from the following:    Height as of this encounter: 1.778 m (5' 10\").    Weight as of this encounter: 138.5 kg (305 lb 6.4 oz).        ______________________________________________________________________    Chief Complaint   Post-op Cares, HTN, DM2    History is obtained from the patient    History of Present Illness   González Sesay is a 76 year old old male with a history of DM2, HTN, HLD, MDD underwent left shoulder arthroplasty with general anesthesia.     He has been in his usual state of health prior to presenting for this elective surgery.  He denies any associated symptoms prior to coming in today, and also denies any recent travel domestically or internationally, and also denies any recent sick contacts around him including any family or friends who have been sick.  When asked, he denies any fevers, rigors, chest pain or shortness of breath, nausea or vomiting or abdominal pain, changes in bowel movements/pattern, urinary symptoms, focal " weakness, or any other new and associated symptoms at this time.  He has been compliant with his medications.  14 point review of systems performed with the patient without any other pertinent positives at this time.    Presently, he states his pain is currently well-tolerated.  He denies any new complaints or symptoms at this time.    His social history, family history, and past medical history were directly reviewed with him and corroborated to be accurate as documented below. All questions he had at this time were answered to his verbalized and stated satisfaction and understanding.     Review of Systems   The 10 point Review of Systems is negative other than noted in the HPI or here.     Past Medical History    I have reviewed this patient's medical history and updated it with pertinent information if needed.   Past Medical History:   Diagnosis Date     Basal cell cancer     ear     Bleeding ulcer      Bone lesion      Carotid stenosis     right, mod stenosis     Depression      Diabetes mellitus (H)      Gastro-oesophageal reflux disease      Hypertension      Multiple sclerosis (H)     in remission     Obese        Past Surgical History   I have reviewed this patient's surgical history and updated it with pertinent information if needed.  Past Surgical History:   Procedure Laterality Date     ARTHROSCOPY KNEE RT/LT      left     BIOPSY BONE LOWER EXTREMITY  8/21/2012    Procedure: BIOPSY BONE LOWER EXTREMITY;  Biopsy Curettage and Bone Grafting of Lesion Right Femoral Neck, Internal Fixation With Cannulated Screw;  Surgeon: Mat Cruz MD;  [facility]     CAROTID ENDARTERECTOMY      right     CARPAL TUNNEL RELEASE RT/LT      both wrists     MIDDLE EAR SURGERY       OPEN REDUCTION INTERNAL FIXATION FEMUR PROXIMAL  8/21/2012    Procedure: OPEN REDUCTION INTERNAL FIXATION FEMUR PROXIMAL;;  Surgeon: Mat Cruz MD;  [facility]     ROTATOR CUFF REPAIR RT/LT      twice right, once on left      THYROID SURGERY      for goiter       Social History   I have reviewed this patient's social history and updated it with pertinent information if needed.  Social History     Tobacco Use     Smoking status: Former     Smokeless tobacco: Never   Vaping Use     Vaping Use: Never used   Substance Use Topics     Alcohol use: Not Currently     Comment: wine socially     Drug use: No       Family History   No genetic or inherited conditions endorsed or reported; no family hx of adverse reaction to anesthesia.     Medications   I have reviewed this patient's current medications  Medications Prior to Admission   Medication Sig Dispense Refill Last Dose     aspirin 81 MG tablet Take 81 mg by mouth daily   10/12/2022     dulaglutide (TRULICITY) 1.5 MG/0.5ML pen Inject 1.5 mg Subcutaneous every 7 days Leo   10/9/2022     escitalopram (LEXAPRO) 20 MG tablet Take 20 mg by mouth daily   10/12/2022     FLUTICASONE PROPIONATE NA Spray 2 sprays in nostril daily   10/12/2022     gabapentin (NEURONTIN) 300 MG capsule Take 600 mg by mouth 3 times daily Take 2 pills   10/13/2022 at 0430     glipiZIDE (GLUCOTROL XL) 10 MG 24 hr tablet Take 10 mg by mouth 2 times daily   10/12/2022 at am     HYDROcodone-acetaminophen (NORCO) 5-325 MG tablet Take 1 tablet by mouth every 4 hours as needed for severe pain   10/13/2022 at 0430     insulin lispro protamine-insulin lispro (HUMALOG MIX 75/25 KWIKPEN) (75-25) 100 UNIT/ML pen Inject 18 Units Subcutaneous 2 times daily (before meals)   10/12/2022 at (9 units pm b/4 surgery)     lisinopril (ZESTRIL) 20 MG tablet Take 20 mg by mouth daily   10/11/2022     metFORMIN (GLUCOPHAGE-XR) 500 MG 24 hr tablet Take 2,000 mg by mouth daily (with dinner).   10/11/2022     Multiple Vitamins-Minerals (CENTRUM SILVER PO) Take 1 tablet by mouth daily.   10/12/2022     pantoprazole (PROTONIX) 40 MG enteric coated tablet Take 40 mg by mouth daily   10/13/2022     polyethylene glycol (MIRALAX) 17 g packet Take 17 g  "by mouth daily as needed for constipation   More than a month     simvastatin (ZOCOR) 20 MG tablet Take 20 mg by mouth At Bedtime   10/11/2022     glucose blood VI test strips (ACCU-CHEK ACTIVE STRIPS) strip by In Vitro route daily.          Allergies   Allergies   Allergen Reactions     Hibiclens Rash       Physical Exam   Vital Signs: Temp: 97.9  F (36.6  C) Temp src: Oral BP: (!) 141/81 Pulse: 81   Resp: (!) 81 SpO2: 99 % O2 Device: Nasal cannula Oxygen Delivery: 1 LPM  Weight: 305 lbs 6.4 oz    GENERAL:  Alert, appears comfortable, in no acute distress, appears stated age   HEAD:  Normocephalic, without obvious abnormality, atraumatic   EYES:  PERRL, conjunctiva/corneas clear, no scleral icterus, EOM's intact   NOSE: Nares normal, septum midline, mucosa normal, no drainage   THROAT: Lips, mucosa, and tongue normal; teeth and gums normal, mouth moist   NECK: Supple, symmetrical, trachea midline   BACK:   Symmetric, no curvature, ROM normal   LUNGS:   Clear to auscultation bilaterally, no rales, rhonchi, or wheezing, symmetric chest rise on inhalation, respirations unlabored   CHEST WALL:  No tenderness or deformity   HEART:  Regular rate and rhythm, S1 and S2 normal, no murmur, rub, or gallop    ABDOMEN:   Soft, non-tender, bowel sounds active all four quadrants, no masses, no organomegaly, no rebound or guarding   EXTREMITIES: Extremities normal, atraumatic, no cyanosis or edema    SKIN: Dry to touch, no exanthems in the visualized areas   NEURO: Alert, oriented x 4, moves all four extremities freely/spontaneously with left shoulder immobilized by cast/sling   PSYCH: Cooperative, behavior is appropriate        Data   I personally reviewed the EKG tracing showing NSR.  Results for orders placed or performed during the hospital encounter of 10/13/22 (from the past 24 hour(s))   POC US Guidance Needle Placement    Narrative    Ultrasound was performed as guidance to an anesthesia procedure.  Click   \"PACS images\" " hyperlink below to view any stored images.  For specific   procedure details, view procedure note authored by anesthesia.   Glucose by meter   Result Value Ref Range    GLUCOSE BY METER POCT 185 (H) 70 - 99 mg/dL   Glucose by meter   Result Value Ref Range    GLUCOSE BY METER POCT 160 (H) 70 - 99 mg/dL   XR Shoulder Left Port G/E 2 Views    Narrative    EXAM: XR SHOULDER LEFT PORT G/E 2 VIEWS  LOCATION: [facility]  DATE/TIME: 10/13/2022 10:17 AM    INDICATION: Left shoulder postoperative follow-up.  COMPARISON: None.      Impression    IMPRESSION:  1.  Left reverse total shoulder arthroplasty. The components are well seated without evidence of complication. No fracture or joint malalignment.  2.  Mild acromioclavicular arthrosis.     Most Recent 3 CBC's:No lab results found.  Most Recent 3 BMP's:Recent Labs   Lab Test 10/13/22  0952 10/13/22  0638   * 185*

## 2022-10-13 NOTE — ANESTHESIA PROCEDURE NOTES
Cervical Plexus (superficial) Procedure Note    Pre-Procedure   Staff -        Anesthesiologist:  Drew Velasco MD       Performed By: anesthesiologist       Location: pre-op       Procedure Start/Stop Times: 10/13/2022 7:05 AM and 10/13/2022 7:07 AM       Pre-Anesthestic Checklist: patient identified, IV checked, site marked, risks and benefits discussed, informed consent, monitors and equipment checked, pre-op evaluation, at physician/surgeon's request and post-op pain management  Timeout:       Correct Patient: Yes        Correct Procedure: Yes        Correct Site: Yes        Correct Position: Yes        Correct Laterality: Yes        Site Marked: Yes  Procedure Documentation  Procedure: Cervical Plexus (superficial)       Laterality: left       Patient Position: supine       Patient Prep/Sterile Barriers: sterile gloves, mask       Skin prep: Chloraprep       Needle Type: short bevel       Needle Gauge: 22.        Needle Length (Inches): 2        Ultrasound guided       1. Ultrasound was used to identify targeted nerve, plexus, vascular marker, or fascial plane and place a needle adjacent to it in real-time.       2. Ultrasound was used to visualize the spread of anesthetic in close proximity to the above referenced structure.       3. A permanent image is entered into the patient's record.       4. The visualized anatomic structures appeared normal.       5. There were no apparent abnormal pathologic findings.    Assessment/Narrative         The placement was negative for: blood aspirated, painful injection and site bleeding       Paresthesias: No.       Bolus given via needle. no blood aspirated via catheter.        Secured via.        Insertion/Infusion Method: Single Shot       Complications: none    Medication(s) Administered   Bupivacaine 0.5% PF (Infiltration) - Infiltration   3 mL - 10/13/2022 7:07:00 AM  Medication Administration Time: 10/13/2022 7:05 AM      FOR [facility] ONLY:    "Pain Team Contact information: please page the Pain Team Via [facility]. Search \"Pain\". During daytime hours, please page the attending first. At night please page the resident first.    "

## 2022-10-13 NOTE — INTERVAL H&P NOTE
"I have reviewed the surgical (or preoperative) H&P that is linked to this encounter, and examined the patient. There are no significant changes    Clinical Conditions Present on Arrival:  Clinically Significant Risk Factors Present on Admission                   # Severe Obesity: Estimated body mass index is 43.82 kg/m  as calculated from the following:    Height as of this encounter: 1.778 m (5' 10\").    Weight as of this encounter: 138.5 kg (305 lb 6.4 oz).       "

## 2022-10-13 NOTE — ANESTHESIA POSTPROCEDURE EVALUATION
Patient: González Sesay    Procedure: Procedure(s):  LEFT REVERSE TOTAL SHOULDER ARTHROPLASTY       Anesthesia Type:  General    Note:     Postop Pain Control: Uneventful            Sign Out: Well controlled pain   PONV: No   Neuro/Psych: Uneventful            Sign Out: Acceptable/Baseline neuro status   Airway/Respiratory: Uneventful            Sign Out: Acceptable/Baseline resp. status   CV/Hemodynamics: Uneventful            Sign Out: Acceptable CV status; No obvious hypovolemia; No obvious fluid overload   Other NRE: NONE   DID A NON-ROUTINE EVENT OCCUR? No           Last vitals:  Vitals Value Taken Time   /67 10/13/22 1040   Temp 36.1  C (96.9  F) 10/13/22 1020   Pulse 72 10/13/22 1125   Resp 31 10/13/22 1043   SpO2 95 % 10/13/22 1125   Vitals shown include unvalidated device data.    Electronically Signed By: Drew Velasco MD  October 13, 2022  11:26 AM

## 2022-10-13 NOTE — PHARMACY-ADMISSION MEDICATION HISTORY
Pharmacist completed medication history with the patient while in the [facility].  Prior to admission (PTA) med list completed and updated in the electronic medical record (EMR).  Pharmacy Note - Admission Medication History  Pertinent Provider Information: none   ______________________________________________________________________  Prior To Admission (PTA) med list completed and updated in EMR.     Medications Prior to Admission   Medication Sig Dispense Refill Last Dose     aspirin 81 MG tablet Take 81 mg by mouth daily   10/12/2022     dulaglutide (TRULICITY) 1.5 MG/0.5ML pen Inject 1.5 mg Subcutaneous every 7 days Leo   10/9/2022     escitalopram (LEXAPRO) 20 MG tablet Take 20 mg by mouth daily   10/12/2022     FLUTICASONE PROPIONATE NA Spray 2 sprays in nostril daily   10/12/2022     gabapentin (NEURONTIN) 300 MG capsule Take 600 mg by mouth 3 times daily Take 2 pills   10/13/2022 at 0430     glipiZIDE (GLUCOTROL XL) 10 MG 24 hr tablet Take 10 mg by mouth 2 times daily   10/12/2022 at am     HYDROcodone-acetaminophen (NORCO) 5-325 MG tablet Take 1 tablet by mouth every 4 hours as needed for severe pain   10/13/2022 at 0430     insulin lispro protamine-insulin lispro (HUMALOG MIX 75/25 KWIKPEN) (75-25) 100 UNIT/ML pen Inject 18 Units Subcutaneous 2 times daily (before meals)   10/12/2022 at (9 units pm b/4 surgery)     lisinopril (ZESTRIL) 20 MG tablet Take 20 mg by mouth daily   10/11/2022     metFORMIN (GLUCOPHAGE-XR) 500 MG 24 hr tablet Take 2,000 mg by mouth daily (with dinner).   10/11/2022     Multiple Vitamins-Minerals (CENTRUM SILVER PO) Take 1 tablet by mouth daily.   10/12/2022     pantoprazole (PROTONIX) 40 MG enteric coated tablet Take 40 mg by mouth daily   10/13/2022     polyethylene glycol (MIRALAX) 17 g packet Take 17 g by mouth daily as needed for constipation   More than a month     simvastatin (ZOCOR) 20 MG tablet Take 20 mg by mouth At Bedtime   10/11/2022     glucose blood VI test strips  (ACCU-CHEK ACTIVE STRIPS) strip by In Vitro route daily.            Information source(s): Patient and CareEverywhere/SureScript[employer]  Patient was asked about OTC/herbal products specifically.  PTA med list reflects this.  Based on the pharmacist s assessment, the PTA med list information appears reliable  Allergies were reviewed, assessed, and updated with the patient.    Medications available for use during hospital stay: NONE  Thank you for the opportunity to participate in the care of this patient.    The patient was asked about OTC/herbal products specifically and the PTA med list reflects the patient's response.    Allergies were reviewed and assessed with the patient, responses were updated in the EMR.    Thank you for the opportunity to participate in the care of this patient.    Ricki Walls RPH 10/13/2022 7:20 AM

## 2022-10-13 NOTE — ANESTHESIA PROCEDURE NOTES
Interscalene Procedure Note    Pre-Procedure   Staff -        Anesthesiologist:  Drew Velasco MD       Performed By: anesthesiologist       Location: pre-op       Procedure Start/Stop Times: 10/13/2022 7:03 AM and 10/13/2022 7:05 AM       Pre-Anesthestic Checklist: patient identified, IV checked, site marked, risks and benefits discussed, informed consent, monitors and equipment checked, pre-op evaluation, at physician/surgeon's request and post-op pain management  Timeout:       Correct Patient: Yes        Correct Procedure: Yes        Correct Site: Yes        Correct Position: Yes        Correct Laterality: Yes        Site Marked: Yes  Procedure Documentation  Procedure: Interscalene       Laterality: left       Patient Position: supine       Patient Prep/Sterile Barriers: sterile gloves, mask       Skin prep: Chloraprep       Needle Type: short bevel       Needle Gauge: 22.        Needle Length (Inches): 2        Ultrasound guided       1. Ultrasound was used to identify targeted nerve, plexus, vascular marker, or fascial plane and place a needle adjacent to it in real-time.       2. Ultrasound was used to visualize the spread of anesthetic in close proximity to the above referenced structure.       3. A permanent image is entered into the patient's record.       4. The visualized anatomic structures appeared normal.       5. There were no apparent abnormal pathologic findings.    Assessment/Narrative         The placement was negative for: blood aspirated, painful injection and site bleeding       Paresthesias: No.       Bolus given via needle. no blood aspirated via catheter.        Secured via.        Insertion/Infusion Method: Single Shot       Complications: none    Medication(s) Administered   Bupivacaine 0.5% PF (Infiltration) - Infiltration   12 mL - 10/13/2022 7:05:00 AM  Bupivacaine liposome (Exparel) 1.3% LA inj susp (Infiltration) - Infiltration   10 mL - 10/13/2022 7:05:00 AM  Medication  "Administration Time: 10/13/2022 7:03 AM      FOR [facility] ([facility]) ONLY:   Pain Team Contact information: please page the Pain Team Via Vitals [email or web address]). Search \"Pain\". During daytime hours, please page the attending first. At night please page the resident first.    "

## 2022-10-13 NOTE — OP NOTE
Operative Note    Name:  González Sesay  :  1945  MRN:  [id number]  Procedure Date:  10/13/2022      Preoperative Diagnosis:  Rotator cuff arthropathy    Postoperative Diagnosis:  SAME    Procedures:  1.REVERSE TOTAL SHOULDER ARTHROPLASTY  2.biceps tenodesis    Prosthetic Devices:      Surgeon(s) and Assistants (if any):    Surgeon(s):   Joseph Ho MD PA-C assist:  Arun Melgar PA-C assistance was required for patient positioning, instrumentation assistance, soft tissue retraction and patient safety.      Anesthesia:  General with Block    Drains:  None    Specimens: None    Tissue Removed, Not Sent: Humeral head    Complications:  NONE    Findings/Conclusions:  irrrepairable subscapularis, supraspinatus and superior infraspinatus    Estimated Blood Loss:  50 mL    Condition on discharge from OR:  Satisfactory    IMPLANTS: Tornier 29 baseplate, 39+6 poly tray, 6B stem, low eccentric +0 tray, 39 glenosphere    INDICATION FOR OPERATION:  González Sesay is a 76 year old male with a history of shoulder pain   and stiffness and he has failed nonsurgical treatment. MRI showed evidence of irrepairable rotator cuff and osteoarthritis of the shoulder. Recommended he undergo shoulder arthroplasty. Risks and benefits of the planned procedure as well as details of surgery were discussed with the patient. Consent was obtained.       REPORT OF OPERATION:   The patient was brought to operating room and placed supine on the operating table. An interscalene block was placed by Anesthesia preoperatively and he was given 3 gram of Ancef preoperatively. After induction of general anesthesia, he was placed in the beach-chair position on the operating room table. All bony prominences were well padded. The shoulder was prepped and draped in normal sterile fashion.    A standard anterior deltopectoral incision was utilized. Deep retractors were placed below the clavipectoral fascia and the deltoid. Anterior capsule  was released. Subscapularis was noted to be torn and retracted. Biceps was tenodesed to the upper border of the pec tendon with #2 fiberwire.    The humeral head was exposed and moderate anterior and inferior humeral osteophytes were resected. Humeral head cut was then performed using appropriate instrumentation. The humerus was then reamed and broached, and a trial stem with a head protection plate was placed. The rotator cuff was noted to have full thickness and retracted tears of the subscapularis, supraspinatus and infraspinatus.   The glenoid was then exposed. The labrum was excised circumferentially.   Inferior capsule release was performed, taking care to protect the axillary nerve.    Next, the glenoid was drilled and reamed and prepared for glenoid implant. The glenoid baseplate was then impacted and the central screw and peripheral screws were placed. A 39 glenosphere was placed.   The humerus was again exposed. The humeral component was trialed, and then a humeral implant was impacted into place.  Next a standard humeral poly cup and tray was impacted into place.    A final reduction was performed, and the shoulder was copiously irrigated with saline irrigation. All instruments were removed. 1 gram of vancomycin was placed into the joint and incision. The incision was then closed in layers with #1 Vicryl closure of the deltopectoral split, 0 Vicryl and 2-0 Vicryl subcutaneous closure, and 3-0 monocryl for skin. A sterile dressing was placed on the shoulder.    He was placed in a shoulder SlingShot brace and  transferred in stable, awake condition to Postanesthesia Recovery. He will be maintained in the sling for 4 weeks postoperatively, may discontinue abduction pillow at 2 week postoperative and begin PT at approximately 2-3 weeks postoperatively.       Joseph Ho MD, M.D.   Date: 10/13/2022  Time: 9:28 AM    Implants:  Implant Name Type Inv. Item Serial No.  Lot No. LRB No. Used Action    BASEPLATE STANDARD AP REVERSED 29MM - [id number] Total Joint Component/Insert BASEPLATE STANDARD AP REVERSED 29MM [id number] [employer]  Left 1 Implanted   GLEOSPHERE LATERALIZED AP REVERSED 39MM [id number] - [id number] Total Joint Component/Insert GLEOSPHERE LATERALIZED AP REVERSED 39MM [id number] [employer]CHN  Left 1 Implanted   SCREW BSPLT 35MM 6.5MM AQLS PRFRM GLND RVRS CNTR NS [id number] - [id number] Metallic Hardware/[place] SCREW BSPLT 35MM 6.5MM AQLS PRFRM GLND RVRS CNTR NS [id number]  [employer] NA Left 1 Implanted   SCREW PERIPHERAL 5.0X34MM [id number] - [id number] Metallic Hardware/[place] SCREW PERIPHERAL 5.0X34MM [id number]  [employer] NA Left 1 Implanted   SCREW PERIPHERAL 5.0X30MM [id number] - [id number] Metallic Hardware/[place] SCREW PERIPHERAL 5.0X30MM [id number]  [employer] NA Left 1 Implanted   SCREW PERIPHERAL 18MM [id number] - [id number] Metallic Hardware/[place] SCREW PERIPHERAL 18MM [id number]  [employer] NA Left 1 Implanted   TRAY REVERSE LOW OFFSET +0 [id number] - [id number] Total Joint Component/Insert TRAY REVERSE LOW OFFSET +0 [id number] [employer]  Left 1 Implanted   INSERT REVISION REVERSE +6/39MM [id number] - [id number] Total Joint Component/Insert INSERT REVISION REVERSE +6/39MM [id number] Verastem TECHN  Left 1 Implanted   STEM HUMERAL ANATOMIC STD PTC 6B - [id number] Total Joint Component/Insert STEM HUMERAL ANATOMIC STD PTC 6B [id number] [employer]  Left 1 Implanted

## 2022-10-13 NOTE — ANESTHESIA CARE TRANSFER NOTE
Patient: González Sesay    Procedure: Procedure(s):  LEFT REVERSE TOTAL SHOULDER ARTHROPLASTY       Diagnosis: Shoulder pain, left [M25.512]  Diagnosis Additional Information: No value filed.    Anesthesia Type:   General     Note:    Oropharynx: oropharynx clear of all foreign objects and spontaneously breathing  Level of Consciousness: awake  Oxygen Supplementation: face mask  Level of Supplemental Oxygen (L/min / FiO2): 8  Independent Airway: airway patency satisfactory and stable  Dentition: dentition unchanged  Vital Signs Stable: post-procedure vital signs reviewed and stable  Report to RN Given: handoff report given  Patient transferred to: PACU    Handoff Report: Identifed the Patient, Identified the Reponsible Provider, Reviewed the pertinent medical history, Discussed the surgical course, Reviewed Intra-OP anesthesia mangement and issues during anesthesia, Set expectations for post-procedure period and Allowed opportunity for questions and acknowledgement of understanding      Vitals:  Vitals Value Taken Time   /97 10/13/22 0950   Temp 36.7  C (98  F) 10/13/22 0950   Pulse 93 10/13/22 0951   Resp 23 10/13/22 0951   SpO2 95 % 10/13/22 0951   Vitals shown include unvalidated device data.    Electronically Signed By: ESTELLE Robles CRNA  October 13, 2022  9:53 AM

## 2022-10-14 NOTE — ED PROVIDER NOTES
ED Provider Note  [facility]      History   No chief complaint on file.    HPI  González Sesay is a 76 year old male who  has a past medical history of Basal cell cancer, Bleeding ulcer, Bone lesion, Carotid stenosis, Depression, Diabetes mellitus (H), Gastro-oesophageal reflux disease, Hypertension, Multiple sclerosis (H), and Obese.    This patient presented to the emergency department by EMS in cardiac arrest.  All history is obtained through EMS.  They report that patient underwent shoulder surgery yesterday at another hospital.  They were called out to the house today for cardiac arrest.  They found the patient to be awake and alert with oxygen saturations in the 70s.  He declined transport according to EMS and they were preparing to leave but got called back into the house because the patient became unresponsive.  He was found to be in PEA.  They began CPR and got an interosseous line in through which she was given 2 rounds of epinephrine.  He was then transported immediately here.        Past Medical History  Past Medical History:   Diagnosis Date     Basal cell cancer     ear     Bleeding ulcer      Bone lesion      Carotid stenosis     right, mod stenosis     Depression      Diabetes mellitus (H)      Gastro-oesophageal reflux disease      Hypertension      Multiple sclerosis (H)     in remission     Obese      Past Surgical History:   Procedure Laterality Date     ARTHROSCOPY KNEE RT/LT      left     BIOPSY BONE LOWER EXTREMITY  8/21/2012    Procedure: BIOPSY BONE LOWER EXTREMITY;  Biopsy Curettage and Bone Grafting of Lesion Right Femoral Neck, Internal Fixation With Cannulated Screw;  Surgeon: Mat Cruz MD;  Location: [facility] OR     CAROTID ENDARTERECTOMY      right     CARPAL TUNNEL RELEASE RT/LT      both wrists     MIDDLE EAR SURGERY       OPEN REDUCTION INTERNAL FIXATION FEMUR PROXIMAL  8/21/2012    Procedure: OPEN REDUCTION INTERNAL FIXATION FEMUR PROXIMAL;;   Surgeon: Mat Cruz MD;  Location: [facility] OR     REVERSE ARTHROPLASTY SHOULDER Left 10/13/2022    Procedure: LEFT REVERSE TOTAL SHOULDER ARTHROPLASTY;  Surgeon: Joseph oH MD;  Location: [facility] Main OR     ROTATOR CUFF REPAIR RT/LT      twice right, once on left     THYROID SURGERY      for goiter     No current outpatient medications on file.    Allergies   Allergen Reactions     Hibiclens Rash     Family History  No family history on file.  Social History   Social History     Tobacco Use     Smoking status: Former     Smokeless tobacco: Never   Vaping Use     Vaping Use: Never used   Substance Use Topics     Alcohol use: Not Currently     Comment: wine socially     Drug use: No      Past medical history, past surgical history, medications, allergies, family history, and social history were reviewed with the patient. No additional pertinent items.       Review of Systems  A complete review of systems was attempted but limited due to Cardiac arrest.    Physical Exam      Physical Exam  Constitutional:       Comments: Patient is actively undergoing CPR with Huy device.  EyeGel noted and patient being ventilated through this.  He is unresponsive without spontaneous movements.  He appears pale.   HENT:      Head: Atraumatic.   Musculoskeletal:      Right lower leg: Edema present.      Left lower leg: Edema present.   Skin:     Coloration: Skin is pale.         ED Course      Procedures                     Results for orders placed or performed during the hospital encounter of 10/14/22   -Intubation     Status: None    Narrative    Mikey Mcgowan MD     10/14/2022  3:04 PM  [facility]    -Intubation    Date/Time: 10/14/2022 3:02 PM  Performed by: Mikey Mcgowan MD  Authorized by: Mikey Mcgowan MD     Emergent condition/consent implied      PRE-PROCEDURE DETAILS     Patient status:  Unresponsive  PROCEDURE DETAILS     Preoxygenation:  MAURA/LMA    CPR in progress: yes       Intubation method:  Oral    Oral intubation technique:  Video-assisted    Laryngoscope blade:  Mac 4    Tube size (mm):  7.5    Tube type:  Cuffed    Number of attempts:  1  PLACEMENT ASSESSMENT     ETT to teeth:  23    Tube secured with:  ETT glaser    Breath sounds:  Diminished    Placement verification: colorimetric ETCO2 device     iStat Gases Electrolytes ICA Glucose Venous, POCT     Status: Abnormal     Medications - No data to display     Assessments & Plan (with Medical Decision Making)     This patient presented to the emergency department in cardiac arrest.  Cath Lab was notified and arrived quickly.  Patient was hooked up to the transport monitor before any further medical resuscitation could be initiated.  Airway was exchanged from a supraglottic device to an endotracheal tube as per the above procedure note and patient was transported to the Cath Lab.    I have reviewed the nursing notes. I have reviewed the findings, diagnosis, plan and need for follow up with the patient.    Current Discharge Medication List          Final diagnoses:   Cardiac arrest (H)       --  Yovani ALVARADO [facility] EMERGENCY DEPARTMENT  10/14/2022     Yovani Schaeffer MD  10/14/22 1590

## 2022-10-14 NOTE — PLAN OF CARE
Pt feels need to void. Up to BR void 20mL. Bladders canned for 58. Enc PO fluid. Has voied 400 total this shift.

## 2022-10-14 NOTE — DISCHARGE SUMMARY
[facility] Discharge Summary    González Sesay MRN# [id number]   Age: 76 year old YOB: 1945     Date of Admission:  10/13/2022  Date of Discharge::  10/14/2022  Admitting Physician:  Joseph Ho MD  Discharge Physician:  Ema Mukherjee PA-C            Admission Diagnoses:   Shoulder pain, left [M25.512]  S/p reverse total shoulder arthroplasty [Z96.619]          Discharge Diagnosis:   Patient Active Problem List    Diagnosis     Type 2 diabetes mellitus without complication, without long-term current use of insulin (H)     Primary hypertension     Mixed hyperlipidemia     Recurrent major depressive disorder, in remission (H)     Status post arthroscopy of shoulder     Unicameral bone cyst     Post-operative state     Irritant dermatitis     Bone lesion     Pain in joint, pelvic region and thigh             Procedures:   Procedure(s): Left reverse TSA       No other procedures performed during this admission           Medications Prior to Admission:     Medications Prior to Admission   Medication Sig Dispense Refill Last Dose     dulaglutide (TRULICITY) 1.5 MG/0.5ML pen Inject 1.5 mg Subcutaneous every 7 days Leo   10/9/2022     escitalopram (LEXAPRO) 20 MG tablet Take 20 mg by mouth daily   10/12/2022     FLUTICASONE PROPIONATE NA Spray 2 sprays in nostril daily   10/12/2022     gabapentin (NEURONTIN) 300 MG capsule Take 600 mg by mouth 3 times daily Take 2 pills   10/13/2022 at 0430     glipiZIDE (GLUCOTROL XL) 10 MG 24 hr tablet Take 10 mg by mouth 2 times daily   10/12/2022 at am     insulin lispro protamine-insulin lispro (HUMALOG MIX 75/25 KWIKPEN) (75-25) 100 UNIT/ML pen Inject 18 Units Subcutaneous 2 times daily (before meals)   10/12/2022 at (9 units pm b/4 surgery)     lisinopril (ZESTRIL) 20 MG tablet Take 20 mg by mouth daily   10/11/2022     metFORMIN (GLUCOPHAGE-XR) 500 MG 24 hr tablet Take 2,000 mg by mouth daily (with dinner).   10/11/2022     Multiple  Vitamins-Minerals (CENTRUM SILVER PO) Take 1 tablet by mouth daily.   10/12/2022     pantoprazole (PROTONIX) 40 MG enteric coated tablet Take 40 mg by mouth daily   10/13/2022     polyethylene glycol (MIRALAX) 17 g packet Take 17 g by mouth daily as needed for constipation   More than a month     simvastatin (ZOCOR) 20 MG tablet Take 20 mg by mouth At Bedtime   10/11/2022     glucose blood VI test strips (ACCU-CHEK ACTIVE STRIPS) strip by In Vitro route daily.        [DISCONTINUED] aspirin 81 MG tablet Take 81 mg by mouth daily   10/12/2022     [DISCONTINUED] HYDROcodone-acetaminophen (NORCO) 5-325 MG tablet Take 1 tablet by mouth every 4 hours as needed for severe pain   10/13/2022 at 0430             Discharge Medications:     Current Discharge Medication List      START taking these medications    Details   aspirin (ASA) 81 MG EC tablet Take 1 tablet (81 mg) by mouth 2 times daily    Associated Diagnoses: Status post arthroscopy of shoulder         CONTINUE these medications which have NOT CHANGED    Details   dulaglutide (TRULICITY) 1.5 MG/0.5ML pen Inject 1.5 mg Subcutaneous every 7 days Sunday      escitalopram (LEXAPRO) 20 MG tablet Take 20 mg by mouth daily      FLUTICASONE PROPIONATE NA Spray 2 sprays in nostril daily      gabapentin (NEURONTIN) 300 MG capsule Take 600 mg by mouth 3 times daily Take 2 pills      glipiZIDE (GLUCOTROL XL) 10 MG 24 hr tablet Take 10 mg by mouth 2 times daily      insulin lispro protamine-insulin lispro (HUMALOG MIX 75/25 KWIKPEN) (75-25) 100 UNIT/ML pen Inject 18 Units Subcutaneous 2 times daily (before meals)      lisinopril (ZESTRIL) 20 MG tablet Take 20 mg by mouth daily      metFORMIN (GLUCOPHAGE-XR) 500 MG 24 hr tablet Take 2,000 mg by mouth daily (with dinner).      Multiple Vitamins-Minerals (CENTRUM SILVER PO) Take 1 tablet by mouth daily.      pantoprazole (PROTONIX) 40 MG enteric coated tablet Take 40 mg by mouth daily      polyethylene glycol (MIRALAX) 17 g packet  Take 17 g by mouth daily as needed for constipation      simvastatin (ZOCOR) 20 MG tablet Take 20 mg by mouth At Bedtime      glucose blood VI test strips (ACCU-CHEK ACTIVE STRIPS) strip by In Vitro route daily.         STOP taking these medications       aspirin 81 MG tablet Comments:   Reason for Stopping:         HYDROcodone-acetaminophen (NORCO) 5-325 MG tablet Comments:   Reason for Stopping:                     Consultations:   Consultation during this admission received from internal medicine          Brief History of Illness:   Reason for admission requiring a surgical or invasive procedure:   Patient Active Problem List    Diagnosis     Type 2 diabetes mellitus without complication, without long-term current use of insulin (H)     Primary hypertension     Mixed hyperlipidemia     Recurrent major depressive disorder, in remission (H)     Status post arthroscopy of shoulder     Unicameral bone cyst     Post-operative state     Irritant dermatitis     Bone lesion     Pain in joint, pelvic region and thigh      The patient underwent the following procedure(s):   Left reverse TSA   There were no immediate complications during this procedure.    Please refer to the full operative summary for details.           Hospital Course:   The patient's hospital course was unremarkable.  He recovered as anticipated and experienced no post-operative complications.           Discharge Instructions and Follow-Up:   Discharge diet: Regular   Discharge activity: Activity as tolerated  Increase activity as tolerated  Restrictions include: NWB to LUE  Wear sling for 4 weeks, abduction pillow can be removed at 2 weeks post-op, PT to begin at 2-3 weeks post-op  Driving restricitIons: no driving while taking narcotic analgesics  Upper activity limits: reaching, lifting, pulling, pushing, carrying, throwing and twisting   Discharge follow-up: Follow up with Dr. Ho in 2 weeks   Wound care: Dressing changes: Dressing to remain in  place until 2 week post-op visit.  Ice to area for comfort  Keep wound clean and dry  Do not submerge incision in hot tub, bath tub, pool           Discharge Disposition:   Discharged to home      Attestation:  Amount of time performed on this discharge summary: 30 minutes.    Ema Mukherjee PA-C

## 2022-10-14 NOTE — ED NOTES
[facility]    -Intubation    Date/Time: 10/14/2022 3:02 PM  Performed by: Mikey Mcgowan MD  Authorized by: Mikey Mcgowan MD     Emergent condition/consent implied      PRE-PROCEDURE DETAILS     Patient status:  Unresponsive  PROCEDURE DETAILS     Preoxygenation:  MAURA/LMA    CPR in progress: yes      Intubation method:  Oral    Oral intubation technique:  Video-assisted    Laryngoscope blade:  Mac 4    Tube size (mm):  7.5    Tube type:  Cuffed    Number of attempts:  1  PLACEMENT ASSESSMENT     ETT to teeth:  23    Tube secured with:  ETT glaser    Breath sounds:  Diminished    Placement verification: colorimetric ETCO2 device           Mikey Mcgowan MD  10/14/22 1503

## 2022-10-14 NOTE — PROGRESS NOTES
[facility]    Medicine Progress Note - Hospitalist Service    Date of Admission:  10/13/2022    Assessment & Plan            González Sesay is a 76 year old old male with a history of DM2, HTN, HLD, MDD underwent left shoulder arthroplasty with general anesthesia. [facility] [place] was asked to evaluate patient for postoperative medical management as follows below. Please resume the home medications as reconciled and further noted below with ordered hold parameters.  Vital signs have been stable post-operatively including hemodynamically stable blood pressure and heart rate. Thank you for this consult; we will continue to follow this patient until discharge.    Hypomagnesemia  -Mag 1.6  -Order 2 g IV mag to replace    HTN  -Order home medications with the written tiered hold parameters given risk for post-operative hypotension. Given ACEi/ARB/diuretic medication, ordered creatine, potassium, and magnesium to evaluate renal function and electrolytes, respectively. Low mag noted as above. Mild asymptomatic hyponatremia.     Mild Asymptomatic Hyponatremia  -Likely due to chronic selective serotonin reuptake inhibitor use.   -No further work-up at this time given mild and asymptomatic.   -Discussed with patient so he is aware.     DM2  -Hold home oral anti-glycemics post-op given risk for acute hypoglycemia with sulfonylureas and nephrotoxicity if contrast is utilized in any emergent imaging with biguanides; utilize insulin corrections three times a day and at bedtime. Restart home oral anti-glycemics step-wise as tolerated once patient is eating as per their normal, usual diet.  -Carb-controlled/diabetic diet.      HLD  -Order home statin.    MDD/Anxiety  -Order home medications.     S/p Left Shoulder Arthroplasty  Shoulder OA  Acute Post-Op Pain  -Agree with current pain control regimen; consider acute pain team consultation if increasingly difficult to control or proves refractory.   -PT  evaluation.   -OT evaluation.  -IS frequently, initially every hour while awake as tolerated. Directly encouraged and discussed.      Post-Op DVT Prophylaxis  -As per primary surgery team.      Procedure(s):  LEFT REVERSE TOTAL SHOULDER ARTHROPLASTY  Post-operative Day: Day of Surgery  Code status:Full Code     Type of Anesthesia   General    Estimated Blood Loss:  50 mL    Hospital Problem List   No problem-specific Assessment & Plan notes found for this encounter.    Active Problems:    * No active hospital problems. *      -Reviewed the patient's preoperative H and P and updated missing elements.  -Home medication reconciliation has been reviewed.  Medications have been ordered as noted from the home list and changes are documented above          Diet: Advance Diet as Tolerated: Regular Diet Adult  Discharge Instruction - Regular Diet Adult  Room Service    DVT Prophylaxis: Defer to primary service  Godwin Catheter: Not present  Central Lines: None  Cardiac Monitoring: None  Code Status: Full Code      Disposition Plan      Expected Discharge Date: 10/14/2022      Destination: home with family          The patient's care was discussed with the Patient.    >40 mins with 50% of the floor time (for inpatient hospital services) spent providing counseling or coordination of care (C/CC).  This includes evaluating hyponatremia, stabilizing the patient s glucose levels and, stabilizing the patient's hemodynamics including blood pressure and heart rate, formulating the appropriate care plan for today, and also extensive counseling regarding hyponatremia and etiologies and causes including from selective serotonin reuptake inhibitor, disease management, lifestyle modifications, and medication regimen with the patient and/or caregivers. Coordination of care for outpatient programs and resources is also crucial and discussed with patient and/or caregivers and completed updated medication reconciliation for safe  "discharge.    Cristobal Nuñez MD  Hospitalist Service  [facility]  Securely message with the [employer] Web Console (learn more here)  Text page via [email or web address] Paging/Directory         Clinically Significant Risk Factors Present on Admission         # Hyponatremia: Na = 131 mmol/L (Ref range: 136 - 145 mmol/L) on admission, will monitor as appropriate         # Hypertension: home medication list includes antihypertensive(s)    # Severe Obesity: Estimated body mass index is 43.82 kg/m  as calculated from the following:    Height as of this encounter: 1.778 m (5' 10\").    Weight as of this encounter: 138.5 kg (305 lb 6.4 oz).        ______________________________________________________________________    Interval History   No acute events overnight.    No report of chest pain, shortness of breath, or other new complaints. Medication reconciliation completed for anticipated discharge and discussed with patient as well as counseling regarding lifestyle modifications and behaviors in setting of post-operative recovery in the coming weeks, outpatient rehabilitative follow-up plan pending final PT/OT recommendations, and follow-up in clinic with the primary care provider and with surgery as scheduled. All questions were answered to stated and verbalized satisfaction.       Data reviewed today: I reviewed all medications, new labs and imaging results over the last 24 hours. I personally reviewed     Physical Exam   Vital Signs: Temp: 98  F (36.7  C) Temp src: Oral BP: 99/51 Pulse: 72   Resp: 20 SpO2: 96 % O2 Device: Nasal cannula Oxygen Delivery: 2 LPM  Weight: 305 lbs 6.4 oz  GENERAL:  Alert, appears comfortable, in no acute distress, appears stated age   HEAD:  Normocephalic, without obvious abnormality, atraumatic   EYES:  PERRL, conjunctiva/corneas clear, no scleral icterus, EOM's intact   NOSE: Nares normal, septum midline, mucosa normal, no drainage   THROAT: Lips, mucosa, and tongue normal; teeth " "and gums normal, mouth moist   NECK: Supple, symmetrical, trachea midline   BACK:   Symmetric, no curvature, ROM normal   LUNGS:   Clear to auscultation bilaterally, no rales, rhonchi, or wheezing, symmetric chest rise on inhalation, respirations unlabored   CHEST WALL:  No tenderness or deformity   HEART:  Regular rate and rhythm, S1 and S2 normal, no murmur, rub, or gallop    ABDOMEN:   Soft, non-tender, bowel sounds active all four quadrants, no masses, no organomegaly, no rebound or guarding   EXTREMITIES: Extremities normal, atraumatic, no cyanosis or edema    SKIN: Dry to touch, no exanthems in the visualized areas   NEURO: Alert, oriented x 4, moves all four extremities freely/spontaneously   PSYCH: Cooperative, behavior is appropriate      Data   Recent Labs   Lab 10/14/22  0746 10/14/22  0716 10/14/22  0227   HGB  --  10.7*  --    NA  --  131*  --    POTASSIUM  --  4.3  --    CHLORIDE  --  94*  --    CO2  --  29  --    BUN  --  18  --    CR  --  0.97  --    ANIONGAP  --  8  --    MISSY  --  8.7  --    * 195* 126*     Recent Results (from the past 24 hour(s))   XR Shoulder Left Port G/E 2 Views    Narrative    EXAM: XR SHOULDER LEFT PORT G/E 2 VIEWS  LOCATION: [facility]  DATE/TIME: 10/13/2022 10:17 AM    INDICATION: Left shoulder postoperative follow-up.  COMPARISON: None.      Impression    IMPRESSION:  1.  Left reverse total shoulder arthroplasty. The components are well seated without evidence of complication. No fracture or joint malalignment.  2.  Mild acromioclavicular arthrosis.     Medications     bupivacaine liposome (EXPAREL) LA inj was given in the infiltration site to produce post-op analgesia. Duration of action is up to 72 hours. Other \"swapna\" meds should not be given for 96 hours except for lidocaine 4% patch. This is for INFORMATION ONLY.       lactated ringers 75 mL/hr at 10/13/22 1603       acetaminophen  975 mg Oral Q8H     aspirin  81 mg Oral BID     " escitalopram  20 mg Oral Daily     fluticasone  2 spray Nasal Daily     gabapentin  600 mg Oral TID     insulin aspart  1-7 Units Subcutaneous TID AC     insulin aspart  1-5 Units Subcutaneous At Bedtime     insulin aspart prot & aspart  14 Units Subcutaneous BID AC     lisinopril  20 mg Oral Daily     pantoprazole  40 mg Oral Daily     polyethylene glycol  17 g Oral Daily     senna-docusate  1 tablet Oral BID     simvastatin  20 mg Oral At Bedtime     sodium chloride (PF)  3 mL Intracatheter Q8H     vancomycin  1 g Topical Once

## 2022-10-14 NOTE — PROGRESS NOTES
Care Management Discharge Note    Discharge Date: 10/14/2022       Discharge Disposition: Home    Discharge Services:  Home with family    Discharge Transportation: family or friend will provide    Education Provided on the Discharge Plan:  AVS per bedside RN.    Persons Notified of Discharge Plans: patient    Patient/Family in Agreement with the Plan: yes      Additional Information:  Chart reviewed. CM met with patient. Patient states he lives with his wife. Denied needs from CM. States family will provide transportation home at discharge. .    OT Recommendations: home with assist    Jo Goncalves RN

## 2022-10-14 NOTE — PROGRESS NOTES
10/14/22 0830   Appointment Info   Signing Clinician's Name / Credentials (OT) Carol Alvarado, OTR/L   Living Environment   People in Home spouse   Current Living Arrangements house   Living Environment Comments has rts with bars, tub shower with chair and bars   Self-Care   Usual Activity Tolerance moderate   Current Activity Tolerance fair   Activity/Exercise/Self-Care Comment wife assits him with adls as needed   General Information   Onset of Illness/Injury or Date of Surgery 10/13/22   Referring Physician Joseph Ho   Patient/Family Therapy Goal Statement (OT) heal well   Additional Occupational Profile Info/Pertinent History of Current Problem pt had a TSA 10/13/2022   Existing Precautions/Restrictions shoulder   Left Upper Extremity (Weight-bearing Status) non weight-bearing (NWB)   Cognitive Status Examination   Affect/Mental Status (Cognitive) WNL   Range of Motion Comprehensive   General Range of Motion no range of motion deficits identified   Comment, General Range of Motion left arm immobilized   Strength Comprehensive (MMT)   General Manual Muscle Testing (MMT) Assessment no strength deficits identified   Comment, General Manual Muscle Testing (MMT) Assessment left arm immobilized   Bed Mobility   Comment (Bed Mobility) pt will sleep in a recliner at home   Transfers   Transfer Comments sba with cane   Activities of Daily Living   BADL Assessment/Intervention upper body dressing;lower body dressing;toileting   Upper Body Dressing Assessment/Training   [place] Level (Upper Body Dressing) moderate assist (50% patient effort)   Lower Body Dressing Assessment/Training   [place] Level (Lower Body Dressing) moderate assist (50% patient effort)   Toileting   [place] Level (Toileting) moderate assist (50% patient effort)   Clinical Impression   Criteria for Skilled Therapeutic Interventions Met (OT) Yes, treatment indicated   OT Diagnosis decreased ind with adls   OT Problem List-Impairments  impacting ADL post-surgical precautions   Assessment of Occupational Performance 3-5 Performance Deficits   Identified Performance Deficits upper and lower body dressing, toileting, transfers   Planned Therapy Interventions (OT) ADL retraining;strengthening   Clinical Decision Making Complexity (OT) moderate complexity   Risk & Benefits of therapy have been explained care plan/treatment goals reviewed;patient;participants voiced agreement with care plan   OT Total Evaluation Time   OT Eval, Moderate Complexity Minutes (79937) 15   OT Goals   Therapy Frequency (OT) One time eval and treatment   OT Predicted Duration/Target Date for Goal Attainment 10/14/22   OT Goals Upper Body Dressing;Lower Body Dressing;Toilet Transfer/Toileting;OT Goal 1   OT: Upper Body Dressing Minimal assist;within precautions;Goal Met;Completed   OT: Lower Body Dressing Minimal assist;within precautions;Goal Met;Completed   OT: Toilet Transfer/Toileting Minimal assist;within precautions;Goal Met;Completed   OT: Goal 1 Pt will complete home ex program:   Codman s, elbow, wrist and hand with mod I, met, completed   Self-Care/Home Management   Self-Care/Home Mgmt/ADL, Compensatory, Meal Prep Minutes (19868) 30   Symptoms Noted During/After Treatment (Meal Preparation/Planning Training) none   Treatment Detail/Skilled Intervention Taught pt. how to doff and don immobilizer, dress upper and lower body, complete hygiene tasks, transfer to toilet, bed, chair, and car all while using carlos techniques.   Pt is min asssit with dressing and toileting and mod i with everything else after OT intervention. Pts wife will asssit home with adls at home. Answered questions from pt. Handouts given.   Therapeutic Procedures/Exercise   Therapeutic Procedure: strength, endurance, ROM, flexibillity minutes (22474) 12   Symptoms Noted During/After Treatment none   Treatment Detail/Skilled Intervention Taught pt. how to perform Codmans, elbow, wrist and hand AROM  ex s.  Gave handout.  Answered questions to pts satisfaction.   OT Discharge Planning   OT Plan dc OT   OT Discharge Recommendation (DC Rec) home with assist   OT Rationale for DC Rec has good support at home from wife   Total Session Time   Timed Code Treatment Minutes 42   Total Session Time (sum of timed and untimed services) 57    [facility]  OUTPATIENT OCCUPATIONAL THERAPY  EVALUATION  PLAN OF TREATMENT FOR OUTPATIENT REHABILITATION  (COMPLETE FOR INITIAL CLAIMS ONLY)  Patient's Last Name, First Name, M.I.  YOB: 1945  González Sesay                          Provider's Name  [facility] Medical Record No.  [id number]                             Onset Date:  10/13/22   Start of Care Date:      Type:     ___PT   _X_OT   ___SLP Medical Diagnosis:                       OT Diagnosis:  decreased ind with adls Visits from SOC:  1     See note for plan of treatment, functional goals and certification details    I CERTIFY THE NEED FOR THESE SERVICES FURNISHED UNDER        THIS PLAN OF TREATMENT AND WHILE UNDER MY CARE     (Physician co-signature of this document indicates review and certification of the therapy plan).

## 2022-10-14 NOTE — PROGRESS NOTES
Occupational Therapy Discharge Summary    Reason for therapy discharge:    All goals and outcomes met, no further needs identified.    Progress towards therapy goal(s). See goals on Care Plan in [facility] electronic health record for goal details.  Goals met    Therapy recommendation(s):    No further therapy is recommended.

## 2022-10-14 NOTE — PROGRESS NOTES
ORTHO PROGRESS NOTE     Procedure: Left reverse TSA  POD # 1  Surgeon: Dr Ho     SUBJECTIVE: Patient upright in bed. Reports pain along [place]-lateral aspect of shoulder. Endorses noticing increased sensation to hand/fingers. Patient states pain about 6/10. Managed well with scheduled narcotic.  Pain well controlled    Flatus (-)  BM (-)  N/V (-)     OBJECTIVE:     Physical Exam:  General: Patient is in no acute distress. Alert and oriented x3. Normal respiratory effort. Distal CMS is intact. Calf is soft and non-tender. 2+ DP pulses.   LUE: Mepilex dressing c/d/I. Sling positioned appropriately.       ASSESSMENT AND PLAN:     - Pain management: tylenol, oxycodone and dilaudid PRN  - Bowel Regimen, narcotic induced  - mepilex in place  - PT/OT while inpatient, continue to NWB to RUE. Pendulums only. No AROM or PROM of RUE. Sling at all times except hygiene.   - VTE prophylaxis: ASA 325mg daily while inpatient, discharge 162 mg daily x 6 weeks      Ema Mukherjee PA-C  [facility]   [phone]   October 14, 2022 at 9:20 AM

## 2022-10-14 NOTE — PLAN OF CARE
"Goal Outcome Evaluation:    Problem: Shoulder Arthroplasty (Total, Jevon, Reverse)  Goal: Absence of Bleeding  Outcome: Progressing     Problem: Shoulder Arthroplasty (Total, Jevon, Reverse)  Goal: Absence of Infection Signs and Symptoms  Outcome: Progressing     Problem: Shoulder Arthroplasty (Total, Jevon, Reverse)  Goal: Acceptable Pain Control  Outcome: Progressing     VSS, afebrile. Requiring 2L via NC to keep sats low to mid 90's. L arm immobilized, dressing CDI. CMS to LUE intact, pulses strong. Pt voiding, states cannot tell if emptying \"all the way.\" Bladder scanned for 0 mL after up to BR. No nausea, active bowel sounds.   "

## 2022-10-14 NOTE — PLAN OF CARE
Patient vital signs are at baseline: No,  Reason:  requires 2L oxygen via NC  Patient able to ambulate as they were prior to admission or with assist devices provided by therapies during their stay:  Yes  Patient MUST void prior to discharge:  Yes  Patient able to tolerate oral intake:  Yes  Pain has adequate pain control using Oral analgesics:  Yes  Does patient have an identified :  Yes  Has goal D/C date and time been discussed with patient:  Yes

## 2022-10-14 NOTE — ED NOTES
Late entry.   BRBA by [facility]. Per report, was initially alert & responsive on scene and refused transport to hospital, rather family would provide. However, medics were flagged down prior to departure d/t patient going unresponsive in the care. Unable to obtain labs, VS d/t resus efforts and transfer to cath lab.

## 2022-10-15 ASSESSMENT — ACTIVITIES OF DAILY LIVING (ADL)
ADLS_ACUITY_SCORE: 35

## 2022-10-16 ASSESSMENT — ACTIVITIES OF DAILY LIVING (ADL)
ADLS_ACUITY_SCORE: 35

## 2022-10-17 ASSESSMENT — ACTIVITIES OF DAILY LIVING (ADL)
ADLS_ACUITY_SCORE: 35

## 2022-10-18 ASSESSMENT — ACTIVITIES OF DAILY LIVING (ADL)
ADLS_ACUITY_SCORE: 35

## 2022-10-19 ASSESSMENT — ACTIVITIES OF DAILY LIVING (ADL)
ADLS_ACUITY_SCORE: 35

## 2022-10-20 ASSESSMENT — ACTIVITIES OF DAILY LIVING (ADL)
ADLS_ACUITY_SCORE: 35

## 2024-09-10 NOTE — CONSULTS
SPIRITUAL HEALTH SERVICES Progress Note  [facility] 2A    I met with González's wife Jaye and son Dawood per consult request. They desired a prayer of last rites for González. Dawood and Jaye shared about González as a father and grandfather. That he was a spiritual [employer] and connected to [employer]. González was a man of humor and the outdoors. I prayed for them and González.    Afshin Moore  Chaplain Resident  Pager [phone]      * [facility] remains available 24/7 for emergent requests/referrals, either by having the switchboard page the on-call  or by entering an ASAP/STAT consult in Epic (this will also page the on-call ). Routine Epic consults receive an initial response within 24 hours.*     10-Sep-2024 20:48

## (undated) DEVICE — PLATE GROUNDING ADULT W/CORD 9165L

## (undated) DEVICE — WIRE GUIDE 0.035"X145CM AMPLATZ XSTIFF J THSCF-35-145-3-AES

## (undated) DEVICE — GOWN SURGICAL SMARTGOWN 2XL 89075

## (undated) DEVICE — SU MONOCRYL 3-0 PS-2 18" UND MCP497G

## (undated) DEVICE — DRESSING MEPILEX BORDER POST-OP 4X8

## (undated) DEVICE — GLOVE BIOGEL INDICATOR 7.5 LF 41675

## (undated) DEVICE — DRILL BIT PERIPHERAL SCREW 3.2MM MWJ126

## (undated) DEVICE — SUTURE VICRYL+ 2-0 27IN CT-1 UND VCP259H

## (undated) DEVICE — SOL NACL 0.9% IRRIG 1000ML BOTTLE 2F7124

## (undated) DEVICE — PACK HEART LEFT CUSTOM

## (undated) DEVICE — INTRO SHEATH 6FRX25CM PINNACLE RSS606

## (undated) DEVICE — BLADE SAW SAGITTAL STRK WIDE 25.4X85X1.2MM 2108-151-000

## (undated) DEVICE — GUIDE PIN STERILE 3X75MM

## (undated) DEVICE — PREP CHLORAPREP W/ORANGE TINT 10.5ML 930715

## (undated) DEVICE — SUCTION IRR SYSTEM W/O TIP INTERPULSE HANDPIECE 0210-100-000

## (undated) DEVICE — Device

## (undated) DEVICE — GLOVE BIOGEL PI ULTRATOUCH G SZ 8.5 42185

## (undated) DEVICE — NEEDLE SUTURE ANCHOR 1824-5DC

## (undated) DEVICE — GLOVE UNDER INDICATOR PI SZ 8.5 LF 41685

## (undated) DEVICE — SUCTION MANIFOLD NEPTUNE 2 SYS 4 PORT 0702-020-000

## (undated) DEVICE — SUTURE VICRYL+ 0 27IN CT-1 UND VCP260H

## (undated) DEVICE — SOL NACL 0.9% INJ 1000ML BAG 2B1324X

## (undated) DEVICE — HOLDER LIMB VELCRO OR 0814-1533

## (undated) DEVICE — SOL WATER IRRIG 1000ML BOTTLE 2F7114

## (undated) DEVICE — A3 SUPPLIES- SEE NURSING INFO PAGE

## (undated) DEVICE — BONE CLEANING TIP INTERPULSE  0210-010-000

## (undated) DEVICE — DRSG STERI STRIP 1/2X4" R1547

## (undated) DEVICE — GUIDEWIRE TORNIER AEQUALIS PERFORM +  2.5X220MM DWD017

## (undated) DEVICE — CUSTOM PACK OPEN SHOULDER SOP5BOSHEB

## (undated) DEVICE — MAT FLOOR SURGICAL 40X38 0702140238

## (undated) DEVICE — SU FIBERWIRE 2 38" T-8 NDL  AR-7206

## (undated) DEVICE — KIT HAND CONTROL ACIST 016795

## (undated) DEVICE — GLOVE SURG PI ULTRA TOUCH M SZ 8 LF

## (undated) RX ORDER — ONDANSETRON 2 MG/ML
INJECTION INTRAMUSCULAR; INTRAVENOUS
Status: DISPENSED
Start: 2022-01-01

## (undated) RX ORDER — PROPOFOL 10 MG/ML
INJECTION, EMULSION INTRAVENOUS
Status: DISPENSED
Start: 2022-01-01

## (undated) RX ORDER — DEXAMETHASONE SODIUM PHOSPHATE 4 MG/ML
INJECTION, SOLUTION INTRA-ARTICULAR; INTRALESIONAL; INTRAMUSCULAR; INTRAVENOUS; SOFT TISSUE
Status: DISPENSED
Start: 2022-01-01